# Patient Record
Sex: FEMALE | Race: BLACK OR AFRICAN AMERICAN | NOT HISPANIC OR LATINO | URBAN - METROPOLITAN AREA
[De-identification: names, ages, dates, MRNs, and addresses within clinical notes are randomized per-mention and may not be internally consistent; named-entity substitution may affect disease eponyms.]

---

## 2024-01-25 ENCOUNTER — INPATIENT (INPATIENT)
Facility: HOSPITAL | Age: 74
LOS: 6 days | Discharge: HOME | End: 2024-02-01
Attending: INTERNAL MEDICINE | Admitting: INTERNAL MEDICINE
Payer: MEDICARE

## 2024-01-25 VITALS
DIASTOLIC BLOOD PRESSURE: 78 MMHG | RESPIRATION RATE: 18 BRPM | OXYGEN SATURATION: 96 % | SYSTOLIC BLOOD PRESSURE: 123 MMHG | TEMPERATURE: 98 F | HEART RATE: 112 BPM

## 2024-01-25 PROCEDURE — 99285 EMERGENCY DEPT VISIT HI MDM: CPT

## 2024-01-25 RX ORDER — THIAMINE MONONITRATE (VIT B1) 100 MG
500 TABLET ORAL EVERY 8 HOURS
Refills: 0 | Status: DISCONTINUED | OUTPATIENT
Start: 2024-01-25 | End: 2024-01-30

## 2024-01-25 RX ORDER — THIAMINE MONONITRATE (VIT B1) 100 MG
500 TABLET ORAL ONCE
Refills: 0 | Status: DISCONTINUED | OUTPATIENT
Start: 2024-01-25 | End: 2024-01-25

## 2024-01-25 RX ORDER — PREGABALIN 225 MG/1
1000 CAPSULE ORAL ONCE
Refills: 0 | Status: COMPLETED | OUTPATIENT
Start: 2024-01-25 | End: 2024-01-25

## 2024-01-25 RX ORDER — FOLIC ACID 0.8 MG
1 TABLET ORAL ONCE
Refills: 0 | Status: COMPLETED | OUTPATIENT
Start: 2024-01-25 | End: 2024-01-25

## 2024-01-25 RX ADMIN — Medication 210 MILLIGRAM(S): at 23:46

## 2024-01-25 RX ADMIN — Medication 1 MILLIGRAM(S): at 23:46

## 2024-01-25 NOTE — ED PROVIDER NOTE - CLINICAL SUMMARY MEDICAL DECISION MAKING FREE TEXT BOX
Dennise: Heavy ETOH use. Acting with inappropriate decisions. Ataxic. Consider Wernicke-Korsakoff. Give thiamine. Check labs. Tox screen. Admit.

## 2024-01-25 NOTE — ED PROVIDER NOTE - ATTENDING CONTRIBUTION TO CARE
I performed a face-to-face evaluation of the patient and performed a history and physical examination. I agree with the history and physical examination. If this was a PA visit, I personally saw the patient with the PA and performed a substantive portion of the visit including all aspects of the medical decision making.    Heavy ETOH use. Acting with inappropriate decisions. Ataxic. Consider Wernicke-Korsakoff. Give thiamine. Check labs. Tox screen. Admit.

## 2024-01-25 NOTE — ED ADULT TRIAGE NOTE - CHIEF COMPLAINT QUOTE
Patient saw PCP today and was ordered to come to ED for ETOH encephalopathy. As per sister, patient is more confused than normal starting yesterday. As per sister, patient is incontinent, which occurs when she drinks.  Last drink was yesterday. Patient A&OX4 at this time. PHX- DM2 no insulin, HTN. bgl 228. Patient denies CP, SOB. Patient able to follow commands. Patient saw PCP today and was ordered to come to ED for hospital admission due to ETOH encephalopathy. As per sister, patient is more confused than normal starting yesterday. As per sister, patient is incontinent, which occurs when she drinks.  Last drink was yesterday. Patient A&OX4 at this time. PHX- DM2 no insulin, HTN. bgl 228. Patient denies CP, SOB. Patient able to follow commands. Patient tachycardic in triage. EKG to be done.

## 2024-01-25 NOTE — ED PROVIDER NOTE - PHYSICAL EXAMINATION
Gen: NAD, AOx3, able to make needs known, non-toxic  Head: NCAT  HEENT: EOMI, normal conjunctiva  Lung: CTAB, no respiratory distress, no wheezes/rhonchi/rales B/L, speaking in full sentences  CV: RRR, no M/R/G, pulses bilaterally   Abd: soft, NTND, no guarding   MSK: no visible bony deformities  Neuro: No focal sensory or motor deficits, able to stand on her own however gait is shuffling/mildly unsteady   Skin: Warm, well perfused, no rash  Psych: normal affect

## 2024-01-25 NOTE — ED PROVIDER NOTE - PROGRESS NOTE DETAILS
Messi, PGY3 - Concern for cerebral venous thrombosis on CAT scan, CTV ordered, neurology paged. Messi, PGY3 - Neurology Edgardo consulted, will come see patient.

## 2024-01-25 NOTE — ED PROVIDER NOTE - OBJECTIVE STATEMENT
73-year-old woman with PMH HTN, alcohol abuse, presenting due to concerns of Wernicke encephalopathy. per sister at bedside she grew concerned starting yesterday when her sister started acting weird e.g. trying to wash dishes with the rubber glove acting as a sponge, trying to take her pills but instead of using water put lotion on her hands, tried to cook dinner but put the pan on the stove with nothing on it and turned it on, staring out into space and not answering questions.  Sister states that today patient appears to be improved however still not at her baseline, has also been having trouble with ambulation and is answering questions slowly that she states is usually present when she drinks.  Found an empty bottle of saniya yesterday however unclear when she actually drink it.  Patient states that she drinks saniya twice a day, small cups.  States that the last time she drank was yesterday, however unable to give a clear answer on consistency, amount.  denies fevers, chills, pain.  Sister and patient went to see PCP Milka Hartmann today who recommended ER evaluation.  Patient has been endorsing headache over the few days prior to confusion episodes.

## 2024-01-26 DIAGNOSIS — M10.9 GOUT, UNSPECIFIED: ICD-10-CM

## 2024-01-26 DIAGNOSIS — R94.31 ABNORMAL ELECTROCARDIOGRAM [ECG] [EKG]: ICD-10-CM

## 2024-01-26 DIAGNOSIS — E11.65 TYPE 2 DIABETES MELLITUS WITH HYPERGLYCEMIA: ICD-10-CM

## 2024-01-26 DIAGNOSIS — E51.2 WERNICKE'S ENCEPHALOPATHY: ICD-10-CM

## 2024-01-26 DIAGNOSIS — G93.40 ENCEPHALOPATHY, UNSPECIFIED: ICD-10-CM

## 2024-01-26 DIAGNOSIS — F10.10 ALCOHOL ABUSE, UNCOMPLICATED: ICD-10-CM

## 2024-01-26 DIAGNOSIS — R93.89 ABNORMAL FINDINGS ON DIAGNOSTIC IMAGING OF OTHER SPECIFIED BODY STRUCTURES: ICD-10-CM

## 2024-01-26 DIAGNOSIS — Z29.9 ENCOUNTER FOR PROPHYLACTIC MEASURES, UNSPECIFIED: ICD-10-CM

## 2024-01-26 DIAGNOSIS — I10 ESSENTIAL (PRIMARY) HYPERTENSION: ICD-10-CM

## 2024-01-26 LAB
ALBUMIN SERPL ELPH-MCNC: 3.6 G/DL — SIGNIFICANT CHANGE UP (ref 3.3–5)
ALBUMIN SERPL ELPH-MCNC: 4.3 G/DL — SIGNIFICANT CHANGE UP (ref 3.3–5)
ALP SERPL-CCNC: 61 U/L — SIGNIFICANT CHANGE UP (ref 40–120)
ALP SERPL-CCNC: 72 U/L — SIGNIFICANT CHANGE UP (ref 40–120)
ALT FLD-CCNC: 13 U/L — SIGNIFICANT CHANGE UP (ref 4–33)
ALT FLD-CCNC: 9 U/L — SIGNIFICANT CHANGE UP (ref 4–33)
AMMONIA BLD-MCNC: 12 UMOL/L — SIGNIFICANT CHANGE UP (ref 11–55)
ANION GAP SERPL CALC-SCNC: 10 MMOL/L — SIGNIFICANT CHANGE UP (ref 7–14)
ANION GAP SERPL CALC-SCNC: 15 MMOL/L — HIGH (ref 7–14)
APPEARANCE UR: CLEAR — SIGNIFICANT CHANGE UP
APTT BLD: 24.2 SEC — LOW (ref 24.5–35.6)
AST SERPL-CCNC: 16 U/L — SIGNIFICANT CHANGE UP (ref 4–32)
AST SERPL-CCNC: 17 U/L — SIGNIFICANT CHANGE UP (ref 4–32)
BACTERIA # UR AUTO: NEGATIVE /HPF — SIGNIFICANT CHANGE UP
BASOPHILS # BLD AUTO: 0.03 K/UL — SIGNIFICANT CHANGE UP (ref 0–0.2)
BASOPHILS # BLD AUTO: 0.05 K/UL — SIGNIFICANT CHANGE UP (ref 0–0.2)
BASOPHILS NFR BLD AUTO: 0.3 % — SIGNIFICANT CHANGE UP (ref 0–2)
BASOPHILS NFR BLD AUTO: 0.6 % — SIGNIFICANT CHANGE UP (ref 0–2)
BILIRUB SERPL-MCNC: 0.3 MG/DL — SIGNIFICANT CHANGE UP (ref 0.2–1.2)
BILIRUB SERPL-MCNC: 0.4 MG/DL — SIGNIFICANT CHANGE UP (ref 0.2–1.2)
BILIRUB UR-MCNC: NEGATIVE — SIGNIFICANT CHANGE UP
BLD GP AB SCN SERPL QL: NEGATIVE — SIGNIFICANT CHANGE UP
BUN SERPL-MCNC: 17 MG/DL — SIGNIFICANT CHANGE UP (ref 7–23)
BUN SERPL-MCNC: 22 MG/DL — SIGNIFICANT CHANGE UP (ref 7–23)
CALCIUM SERPL-MCNC: 8 MG/DL — LOW (ref 8.4–10.5)
CALCIUM SERPL-MCNC: 8.8 MG/DL — SIGNIFICANT CHANGE UP (ref 8.4–10.5)
CAST: 0 /LPF — SIGNIFICANT CHANGE UP (ref 0–4)
CHLORIDE SERPL-SCNC: 107 MMOL/L — SIGNIFICANT CHANGE UP (ref 98–107)
CHLORIDE SERPL-SCNC: 110 MMOL/L — HIGH (ref 98–107)
CK MB BLD-MCNC: 1.6 % — SIGNIFICANT CHANGE UP (ref 0–2.5)
CK MB CFR SERPL CALC: 1.2 NG/ML — SIGNIFICANT CHANGE UP
CK SERPL-CCNC: 74 U/L — SIGNIFICANT CHANGE UP (ref 25–170)
CO2 SERPL-SCNC: 26 MMOL/L — SIGNIFICANT CHANGE UP (ref 22–31)
CO2 SERPL-SCNC: 27 MMOL/L — SIGNIFICANT CHANGE UP (ref 22–31)
COLOR SPEC: YELLOW — SIGNIFICANT CHANGE UP
CREAT SERPL-MCNC: 1.08 MG/DL — SIGNIFICANT CHANGE UP (ref 0.5–1.3)
CREAT SERPL-MCNC: 1.41 MG/DL — HIGH (ref 0.5–1.3)
DIFF PNL FLD: NEGATIVE — SIGNIFICANT CHANGE UP
EGFR: 40 ML/MIN/1.73M2 — LOW
EGFR: 55 ML/MIN/1.73M2 — LOW
EOSINOPHIL # BLD AUTO: 0.03 K/UL — SIGNIFICANT CHANGE UP (ref 0–0.5)
EOSINOPHIL # BLD AUTO: 0.03 K/UL — SIGNIFICANT CHANGE UP (ref 0–0.5)
EOSINOPHIL NFR BLD AUTO: 0.3 % — SIGNIFICANT CHANGE UP (ref 0–6)
EOSINOPHIL NFR BLD AUTO: 0.4 % — SIGNIFICANT CHANGE UP (ref 0–6)
ETHANOL SERPL-MCNC: <10 MG/DL — SIGNIFICANT CHANGE UP
FOLATE SERPL-MCNC: >20 NG/ML — HIGH (ref 3.1–17.5)
GLUCOSE BLDC GLUCOMTR-MCNC: 137 MG/DL — HIGH (ref 70–99)
GLUCOSE BLDC GLUCOMTR-MCNC: 159 MG/DL — HIGH (ref 70–99)
GLUCOSE BLDC GLUCOMTR-MCNC: 202 MG/DL — HIGH (ref 70–99)
GLUCOSE SERPL-MCNC: 140 MG/DL — HIGH (ref 70–99)
GLUCOSE SERPL-MCNC: 154 MG/DL — HIGH (ref 70–99)
GLUCOSE UR QL: NEGATIVE MG/DL — SIGNIFICANT CHANGE UP
HCT VFR BLD CALC: 32.6 % — LOW (ref 34.5–45)
HCT VFR BLD CALC: 38.7 % — SIGNIFICANT CHANGE UP (ref 34.5–45)
HGB BLD-MCNC: 10.5 G/DL — LOW (ref 11.5–15.5)
HGB BLD-MCNC: 12.1 G/DL — SIGNIFICANT CHANGE UP (ref 11.5–15.5)
IANC: 4.7 K/UL — SIGNIFICANT CHANGE UP (ref 1.8–7.4)
IANC: 5.69 K/UL — SIGNIFICANT CHANGE UP (ref 1.8–7.4)
IMM GRANULOCYTES NFR BLD AUTO: 0.3 % — SIGNIFICANT CHANGE UP (ref 0–0.9)
IMM GRANULOCYTES NFR BLD AUTO: 0.3 % — SIGNIFICANT CHANGE UP (ref 0–0.9)
INR BLD: 1.02 RATIO — SIGNIFICANT CHANGE UP (ref 0.85–1.18)
KETONES UR-MCNC: NEGATIVE MG/DL — SIGNIFICANT CHANGE UP
LEUKOCYTE ESTERASE UR-ACNC: NEGATIVE — SIGNIFICANT CHANGE UP
LYMPHOCYTES # BLD AUTO: 1.96 K/UL — SIGNIFICANT CHANGE UP (ref 1–3.3)
LYMPHOCYTES # BLD AUTO: 2.32 K/UL — SIGNIFICANT CHANGE UP (ref 1–3.3)
LYMPHOCYTES # BLD AUTO: 25 % — SIGNIFICANT CHANGE UP (ref 13–44)
LYMPHOCYTES # BLD AUTO: 25.4 % — SIGNIFICANT CHANGE UP (ref 13–44)
MAGNESIUM SERPL-MCNC: 1.9 MG/DL — SIGNIFICANT CHANGE UP (ref 1.6–2.6)
MCHC RBC-ENTMCNC: 27.1 PG — SIGNIFICANT CHANGE UP (ref 27–34)
MCHC RBC-ENTMCNC: 27.1 PG — SIGNIFICANT CHANGE UP (ref 27–34)
MCHC RBC-ENTMCNC: 31.3 GM/DL — LOW (ref 32–36)
MCHC RBC-ENTMCNC: 32.2 GM/DL — SIGNIFICANT CHANGE UP (ref 32–36)
MCV RBC AUTO: 84.2 FL — SIGNIFICANT CHANGE UP (ref 80–100)
MCV RBC AUTO: 86.6 FL — SIGNIFICANT CHANGE UP (ref 80–100)
MONOCYTES # BLD AUTO: 0.96 K/UL — HIGH (ref 0–0.9)
MONOCYTES # BLD AUTO: 1.18 K/UL — HIGH (ref 0–0.9)
MONOCYTES NFR BLD AUTO: 12.4 % — SIGNIFICANT CHANGE UP (ref 2–14)
MONOCYTES NFR BLD AUTO: 12.7 % — SIGNIFICANT CHANGE UP (ref 2–14)
NEUTROPHILS # BLD AUTO: 4.7 K/UL — SIGNIFICANT CHANGE UP (ref 1.8–7.4)
NEUTROPHILS # BLD AUTO: 5.69 K/UL — SIGNIFICANT CHANGE UP (ref 1.8–7.4)
NEUTROPHILS NFR BLD AUTO: 60.9 % — SIGNIFICANT CHANGE UP (ref 43–77)
NEUTROPHILS NFR BLD AUTO: 61.4 % — SIGNIFICANT CHANGE UP (ref 43–77)
NITRITE UR-MCNC: NEGATIVE — SIGNIFICANT CHANGE UP
NRBC # BLD: 0 /100 WBCS — SIGNIFICANT CHANGE UP (ref 0–0)
NRBC # BLD: 0 /100 WBCS — SIGNIFICANT CHANGE UP (ref 0–0)
NRBC # FLD: 0 K/UL — SIGNIFICANT CHANGE UP (ref 0–0)
NRBC # FLD: 0 K/UL — SIGNIFICANT CHANGE UP (ref 0–0)
PH UR: 5.5 — SIGNIFICANT CHANGE UP (ref 5–8)
PHOSPHATE SERPL-MCNC: 3.5 MG/DL — SIGNIFICANT CHANGE UP (ref 2.5–4.5)
PLATELET # BLD AUTO: 219 K/UL — SIGNIFICANT CHANGE UP (ref 150–400)
PLATELET # BLD AUTO: 265 K/UL — SIGNIFICANT CHANGE UP (ref 150–400)
POTASSIUM SERPL-MCNC: 4 MMOL/L — SIGNIFICANT CHANGE UP (ref 3.5–5.3)
POTASSIUM SERPL-MCNC: 4.5 MMOL/L — SIGNIFICANT CHANGE UP (ref 3.5–5.3)
POTASSIUM SERPL-SCNC: 4 MMOL/L — SIGNIFICANT CHANGE UP (ref 3.5–5.3)
POTASSIUM SERPL-SCNC: 4.5 MMOL/L — SIGNIFICANT CHANGE UP (ref 3.5–5.3)
PROT SERPL-MCNC: 6.4 G/DL — SIGNIFICANT CHANGE UP (ref 6–8.3)
PROT SERPL-MCNC: 7.9 G/DL — SIGNIFICANT CHANGE UP (ref 6–8.3)
PROT UR-MCNC: NEGATIVE MG/DL — SIGNIFICANT CHANGE UP
PROTHROM AB SERPL-ACNC: 11.4 SEC — SIGNIFICANT CHANGE UP (ref 9.5–13)
RBC # BLD: 3.87 M/UL — SIGNIFICANT CHANGE UP (ref 3.8–5.2)
RBC # BLD: 4.47 M/UL — SIGNIFICANT CHANGE UP (ref 3.8–5.2)
RBC # FLD: 16.4 % — HIGH (ref 10.3–14.5)
RBC # FLD: 16.6 % — HIGH (ref 10.3–14.5)
RBC CASTS # UR COMP ASSIST: 1 /HPF — SIGNIFICANT CHANGE UP (ref 0–4)
RH IG SCN BLD-IMP: POSITIVE — SIGNIFICANT CHANGE UP
SODIUM SERPL-SCNC: 147 MMOL/L — HIGH (ref 135–145)
SODIUM SERPL-SCNC: 148 MMOL/L — HIGH (ref 135–145)
SP GR SPEC: 1.02 — SIGNIFICANT CHANGE UP (ref 1–1.03)
SQUAMOUS # UR AUTO: 0 /HPF — SIGNIFICANT CHANGE UP (ref 0–5)
TROPONIN T, HIGH SENSITIVITY RESULT: 14 NG/L — SIGNIFICANT CHANGE UP
TSH SERPL-MCNC: 1.13 UIU/ML — SIGNIFICANT CHANGE UP (ref 0.27–4.2)
UROBILINOGEN FLD QL: 0.2 MG/DL — SIGNIFICANT CHANGE UP (ref 0.2–1)
VIT B12 SERPL-MCNC: 688 PG/ML — SIGNIFICANT CHANGE UP (ref 200–900)
WBC # BLD: 7.72 K/UL — SIGNIFICANT CHANGE UP (ref 3.8–10.5)
WBC # BLD: 9.28 K/UL — SIGNIFICANT CHANGE UP (ref 3.8–10.5)
WBC # FLD AUTO: 7.72 K/UL — SIGNIFICANT CHANGE UP (ref 3.8–10.5)
WBC # FLD AUTO: 9.28 K/UL — SIGNIFICANT CHANGE UP (ref 3.8–10.5)
WBC UR QL: 0 /HPF — SIGNIFICANT CHANGE UP (ref 0–5)

## 2024-01-26 PROCEDURE — 93306 TTE W/DOPPLER COMPLETE: CPT | Mod: 26

## 2024-01-26 PROCEDURE — 70450 CT HEAD/BRAIN W/O DYE: CPT | Mod: 26,MG

## 2024-01-26 PROCEDURE — 71045 X-RAY EXAM CHEST 1 VIEW: CPT | Mod: 26

## 2024-01-26 PROCEDURE — 99223 1ST HOSP IP/OBS HIGH 75: CPT

## 2024-01-26 PROCEDURE — G1004: CPT

## 2024-01-26 RX ORDER — FOLIC ACID 0.8 MG
1 TABLET ORAL DAILY
Refills: 0 | Status: DISCONTINUED | OUTPATIENT
Start: 2024-01-26 | End: 2024-02-01

## 2024-01-26 RX ORDER — INSULIN LISPRO 100/ML
VIAL (ML) SUBCUTANEOUS
Refills: 0 | Status: DISCONTINUED | OUTPATIENT
Start: 2024-01-26 | End: 2024-02-01

## 2024-01-26 RX ORDER — INSULIN LISPRO 100/ML
VIAL (ML) SUBCUTANEOUS AT BEDTIME
Refills: 0 | Status: DISCONTINUED | OUTPATIENT
Start: 2024-01-26 | End: 2024-02-01

## 2024-01-26 RX ORDER — DEXTROSE 50 % IN WATER 50 %
12.5 SYRINGE (ML) INTRAVENOUS ONCE
Refills: 0 | Status: DISCONTINUED | OUTPATIENT
Start: 2024-01-26 | End: 2024-02-01

## 2024-01-26 RX ORDER — SODIUM CHLORIDE 9 MG/ML
1000 INJECTION, SOLUTION INTRAVENOUS
Refills: 0 | Status: DISCONTINUED | OUTPATIENT
Start: 2024-01-26 | End: 2024-02-01

## 2024-01-26 RX ORDER — SODIUM CHLORIDE 9 MG/ML
1000 INJECTION INTRAMUSCULAR; INTRAVENOUS; SUBCUTANEOUS ONCE
Refills: 0 | Status: COMPLETED | OUTPATIENT
Start: 2024-01-26 | End: 2024-01-26

## 2024-01-26 RX ORDER — PANTOPRAZOLE SODIUM 20 MG/1
1 TABLET, DELAYED RELEASE ORAL
Refills: 0 | DISCHARGE

## 2024-01-26 RX ORDER — ENOXAPARIN SODIUM 100 MG/ML
40 INJECTION SUBCUTANEOUS EVERY 24 HOURS
Refills: 0 | Status: DISCONTINUED | OUTPATIENT
Start: 2024-01-26 | End: 2024-02-01

## 2024-01-26 RX ORDER — ACETAMINOPHEN 500 MG
650 TABLET ORAL EVERY 6 HOURS
Refills: 0 | Status: DISCONTINUED | OUTPATIENT
Start: 2024-01-26 | End: 2024-02-01

## 2024-01-26 RX ORDER — DEXTROSE 50 % IN WATER 50 %
25 SYRINGE (ML) INTRAVENOUS ONCE
Refills: 0 | Status: DISCONTINUED | OUTPATIENT
Start: 2024-01-26 | End: 2024-02-01

## 2024-01-26 RX ORDER — ALLOPURINOL 300 MG
1 TABLET ORAL
Refills: 0 | DISCHARGE

## 2024-01-26 RX ORDER — PANTOPRAZOLE SODIUM 20 MG/1
40 TABLET, DELAYED RELEASE ORAL
Refills: 0 | Status: DISCONTINUED | OUTPATIENT
Start: 2024-01-26 | End: 2024-02-01

## 2024-01-26 RX ORDER — GLUCAGON INJECTION, SOLUTION 0.5 MG/.1ML
1 INJECTION, SOLUTION SUBCUTANEOUS ONCE
Refills: 0 | Status: DISCONTINUED | OUTPATIENT
Start: 2024-01-26 | End: 2024-02-01

## 2024-01-26 RX ORDER — DEXTROSE 50 % IN WATER 50 %
15 SYRINGE (ML) INTRAVENOUS ONCE
Refills: 0 | Status: DISCONTINUED | OUTPATIENT
Start: 2024-01-26 | End: 2024-02-01

## 2024-01-26 RX ORDER — LANOLIN ALCOHOL/MO/W.PET/CERES
3 CREAM (GRAM) TOPICAL AT BEDTIME
Refills: 0 | Status: DISCONTINUED | OUTPATIENT
Start: 2024-01-26 | End: 2024-02-01

## 2024-01-26 RX ORDER — ALLOPURINOL 300 MG
100 TABLET ORAL DAILY
Refills: 0 | Status: DISCONTINUED | OUTPATIENT
Start: 2024-01-26 | End: 2024-02-01

## 2024-01-26 RX ORDER — METFORMIN HYDROCHLORIDE 850 MG/1
1 TABLET ORAL
Refills: 0 | DISCHARGE

## 2024-01-26 RX ADMIN — Medication 2: at 13:02

## 2024-01-26 RX ADMIN — Medication 210 MILLIGRAM(S): at 06:16

## 2024-01-26 RX ADMIN — SODIUM CHLORIDE 1000 MILLILITER(S): 9 INJECTION INTRAMUSCULAR; INTRAVENOUS; SUBCUTANEOUS at 02:36

## 2024-01-26 RX ADMIN — Medication 210 MILLIGRAM(S): at 15:35

## 2024-01-26 RX ADMIN — Medication 210 MILLIGRAM(S): at 21:30

## 2024-01-26 RX ADMIN — ENOXAPARIN SODIUM 40 MILLIGRAM(S): 100 INJECTION SUBCUTANEOUS at 08:01

## 2024-01-26 RX ADMIN — Medication 1 MILLIGRAM(S): at 13:50

## 2024-01-26 RX ADMIN — PANTOPRAZOLE SODIUM 40 MILLIGRAM(S): 20 TABLET, DELAYED RELEASE ORAL at 07:45

## 2024-01-26 RX ADMIN — Medication 1 TABLET(S): at 13:50

## 2024-01-26 RX ADMIN — Medication 100 MILLIGRAM(S): at 13:50

## 2024-01-26 RX ADMIN — PREGABALIN 1000 MICROGRAM(S): 225 CAPSULE ORAL at 00:27

## 2024-01-26 NOTE — ED ADULT NURSE NOTE - NSFALLRISKINTERV_ED_ALL_ED
Assistance OOB with selected safe patient handling equipment if applicable/Assistance with ambulation/Communicate fall risk and risk factors to all staff, patient, and family/Monitor gait and stability/Provide visual cue: yellow wristband, yellow gown, etc/Reinforce activity limits and safety measures with patient and family/Call bell, personal items and telephone in reach/Instruct patient to call for assistance before getting out of bed/chair/stretcher/Non-slip footwear applied when patient is off stretcher/[place] to call system/Physically safe environment - no spills, clutter or unnecessary equipment/Purposeful Proactive Rounding/Room/bathroom lighting operational, light cord in reach

## 2024-01-26 NOTE — ED ADULT NURSE NOTE - OBJECTIVE STATEMENT
72 y/o female, a&ox4, sent to ED by MD. Past medical history of alcohol abuse, pt endorses daily drinking, thinks her last drink was yesterday evening. Pt sent to ED by MD for concern of encephalopathy. Family at bedside endorsing strange behavior. Pt presents calm and cooperative, following commands, however, slow to respond. Denying any symptoms of alcohol withdrawal at this time. 20GIV placed to [facility], labs collected and sent off. Pt medicated as per MD orders. Airway is patent, speaking in clear and coherent sentences. Respirations are even and unlabored, no signs of respiratory distress.

## 2024-01-26 NOTE — H&P ADULT - PROBLEM SELECTOR PLAN 7
Lovenox 40mg qd  DASH/TLC CC diet sinus tachycardia @ 107bpm, biatrial enlargement, downsloping/STD? ST segment in lateral leads, II, AVF  -tele, echo ordered  -no CP, SOB or other cardiac complaints

## 2024-01-26 NOTE — H&P ADULT - PROBLEM/PLAN-6
Talked to father about forms requested by mom   Children in his care and he is requesting info not to be given out    He will discuss with mother gabbi poon and other sibs    DISPLAY PLAN FREE TEXT

## 2024-01-26 NOTE — ED ADULT NURSE NOTE - CHIEF COMPLAINT QUOTE
Patient saw PCP today and was ordered to come to ED for hospital admission due to ETOH encephalopathy. As per sister, patient is more confused than normal starting yesterday. As per sister, patient is incontinent, which occurs when she drinks.  Last drink was yesterday. Patient A&OX4 at this time. PHX- DM2 no insulin, HTN. bgl 228. Patient denies CP, SOB. Patient able to follow commands. Patient tachycardic in triage. EKG to be done.

## 2024-01-26 NOTE — PHARMACOTHERAPY INTERVENTION NOTE - COMMENTS
Medication history is incomplete. Unable to verify patient's medication list with two sources. Source: [facility]    As per [facility] , no recent fills of allopurinol, or colchicine.

## 2024-01-26 NOTE — H&P ADULT - NSHPREVIEWOFSYSTEMS_GEN_ALL_CORE
ROS:    Constitutional: [ ] fevers [ ] chills   HEENT: [ ] postnasal drip [ ] nasal congestion  CV: [ ] chest pain [ ] orthopnea [ ] palpitations   Resp: [ ] cough [ ] shortness of breath [ ] dyspnea   GI: [ ] nausea [ ] vomiting [ ] diarrhea [ ] constipation [ ] abd pain  : [ ] dysuria  [ ] increased urinary frequency  Musculoskeletal: [ ] back pain [ ] myalgias [ ] arthralgias [ ] fracture  Skin: [ ] rash [ ] itch  Neurological: [ ] headache [ ] dizziness [ ] syncope [ x] confusion [x] ataxia  Psychiatric: [ ] anxiety [ ] depression  Endocrine: [ ] diabetes [ ] thyroid problem  Hematologic/Lymphatic: [ ] anemia [ ] bleeding problem  [x ] All other systems negative

## 2024-01-26 NOTE — CONSULT NOTE ADULT - ASSESSMENT
ASSESSMENT   73y woman with a PMHx significant for HTN, alcohol abuse, presenting due to concerns of Wernicke encephalopathy. Patient has been endorsing headache over the few days prior to confusion episodes. Upon interviewing, the patient complained of a bifrontal headache that was constant with slow onset but with max intensity at 10/10. Currently denies headache, visual nor audio hallucinations. CT head demonstrating possible straight sinus thrombosis for which neurology was consulted. NIHSS 2 for dysarthria and wrong age. No focal deficits.     IMPRESSION   Found on CT head to have possible venous sinus thrombosis. Currently asymptomatic, therefore would defer acute intervention for now, pending additional imaging     RECOMMENDATION   [ ] check UA, Utox, ETOH level, TSH, vitamin B1, B6, B12, folate, lactate, creatine kinase, ammonia  [ ] Thiamine repletion   [ ] MR brain w/o contrast, MRV head and neck with contrast, if no contraindications  [ ] Will hold on symptomatic management for now. If worsening throughout the evening, can provide: Toradol 30mg, Reglan 10mg, Benadryl 25mg   [ ] IV hydration  [ ] Further recs pending MR imaging results  [ ] Can consider STAT CT head non-contrast for any change in neuro exam    Patient to be seen by team and attending. Note finalized upon attending attestation.

## 2024-01-26 NOTE — H&P ADULT - NSHPLABSRESULTS_GEN_ALL_CORE
Personally reviewed labs:                        10.5   7.72  )-----------( 219      ( 26 Jan 2024 06:00 )             32.6     01-25-24 @ 23:25    148<H>  |  107  |  22             --------------------------< 154<H>     4.5  |  26  | 1.41<H>    eGFR AA: --  eGFR N-AA: --    Calcium: 8.8  Phosphorus: 3.5  Magnesium: 1.90    AST: 17    ALT: 13  AlkPhos: 72  Protein: 7.9  Albumin: 4.3  TBili: 0.3  D-Bili: --          RADIOLOGY & ADDITIONAL TESTS:    EKG my independent interpretation: sinus tachycardia @ 107bpm, biatrial enlargement, downsloping/STD? ST segment in lateral leads, II, AVF      Imaging personally reviewed:    CTH :  IMPRESSION:  Hyperdense straight sinus and vein of Goyo suspicious for central venous   thrombosis. Recommend CT venogram.

## 2024-01-26 NOTE — H&P ADULT - PROBLEM SELECTOR PLAN 2
Drinks one bottle of saniya per day, last drink 1/24 4PM. Denies withdrawal sx at this time. Comfortable appearing  -MV, FA, thiamine as above  -CIWA with sx triggered ativan

## 2024-01-26 NOTE — SBIRT NOTE ADULT - NSSBIRTALCPASSREFTXDET_GEN_A_CORE
Provided SBIRT services: Full screen positive. Referral to Treatment Performed. Screening results were reviewed with the patient and patient was provided information about healthy guidelines and potential negative consequences associated with level of risk. Motivation and readiness to reduce or stop use was discussed and goals and activities to make changes were suggested/offered. Referral for complete assessment and level of care determination at a certified treatment facility was completed by giving the patient information for treatment facilities that met their needs and encouraging them to call for an appointment. several calls made to facilities but no appointment made.

## 2024-01-26 NOTE — H&P ADULT - PROBLEM SELECTOR PLAN 3
CTH: Hyperdense straight sinus and vein of Goyo suspicious for central venous thrombosis. Currently asymptomatic and no focal deficits. No HA at this time.   -Seen by neuro and recommended no intervention and obtain MRI to confirm. MRI brain without contrast and MRV H/N with IV contrast

## 2024-01-26 NOTE — PATIENT PROFILE ADULT - FALL HARM RISK - HARM RISK INTERVENTIONS
Assistance with ambulation/Assistance OOB with selected safe patient handling equipment/Communicate Risk of Fall with Harm to all staff/Discuss with provider need for PT consult/Monitor for mental status changes/Monitor gait and stability/Reinforce activity limits and safety measures with patient and family/Reorient to person, place and time as needed/Review medications for side effects contributing to fall risk/Sit up slowly, dangle for a short time, stand at bedside before walking/Tailored Fall Risk Interventions/Toileting schedule using arm’s reach rule for commode and bathroom/Use of alarms - bed, chair and/or voice tab/Visual Cue: Yellow wristband and red socks/Bed in lowest position, wheels locked, appropriate side rails in place/Call bell, personal items and telephone in reach/Instruct patient to call for assistance before getting out of bed or chair/Non-slip footwear when patient is out of bed/[place] to call system/Physically safe environment - no spills, clutter or unnecessary equipment/Purposeful Proactive Rounding/Room/bathroom lighting operational, light cord in reach

## 2024-01-26 NOTE — PROVIDER CONTACT NOTE (OTHER) - BACKGROUND
73y woman with a PMHx significant for HTN, alcohol abuse, presenting due to concerns of Wernicke encephalopathy.

## 2024-01-26 NOTE — CONSULT NOTE ADULT - ATTENDING COMMENTS
Ms. Dunlap is admitted for symptoms suggestive of Warnicke's encephalopathy in the setting of alcohol abuse.  She has past medical history of hypertension.  Noncontrast head CT reveals hyperdense straits sinus ambulance given which was suspicious for cerebral sinus venous thrombosis.  At this time on imaging there is no venous infarction or hemorrhage or apparent vascular malformation.  I have recommended MRI brain with and without contrast, MRV to rule out sinus venous thrombosis.  Until then she should be on aspirin 81 mg and Lovenox prophylaxis dose.  Stroke team will follow imaging and make further recommendations.

## 2024-01-26 NOTE — PROGRESS NOTE ADULT - ASSESSMENT
73F with PMHx HTN, gout, ETOH abuse, DM2, GERD presenting with confusion brought in by sister. Admitted for acute encephalopathy and concern for wernicke's encephalopathy

## 2024-01-26 NOTE — H&P ADULT - PROBLEM SELECTOR PLAN 1
Odd behavior at home. Here without complaints at this time and oriented x3. Hx of ataxia as well and ETOH abuse. Possible wernicke's encephalopathy   -IV thiamine 500mg q8. (dose given prior to lab draw so thiamine level likely to be inaccurate)  -folate, MV  -CTH: Hyperdense straight sinus and vein of Goyo suspicious for central venous thrombosis. Currently asymptomatic and no focal deficits. No HA at this time. Seen by neuro and recommended no intervention and obtain MRI to confirm  -check UA (neg), Utox, ETOH level (neg), TSH, vitamin B1, B6, B12, folate, lactate, creatine kinase, ammonia (nl)  -toradol, reglan, benadryl if needed for HA if it returns  -no photophobia, no fever, no meningeal signs. Do not suspect meningitis

## 2024-01-26 NOTE — H&P ADULT - NSHPPHYSICALEXAM_GEN_ALL_CORE
PHYSICAL EXAM:  Vital Signs Last 24 Hrs  T(C): 37.1 (01-26-24 @ 04:35)  T(F): 98.7 (01-26-24 @ 04:35), Max: 98.7 (01-26-24 @ 04:35)  HR: 78 (01-26-24 @ 04:35) (78 - 112)  BP: 152/92 (01-26-24 @ 04:35)  BP(mean): --  RR: 16 (01-26-24 @ 04:35) (16 - 20)  SpO2: 100% (01-26-24 @ 04:35) (96% - 100%)  Wt(kg): --    Constitutional: NAD, awake and alert, well developed  EYES: EOMI, conjunctiva clear  ENT:  Normal Hearing, no tonsillar exudates   Neck: Soft and supple , no thyromegaly   Respiratory: Breath sounds are clear bilaterally, No wheezing, rales or rhonchi, no tachypnea, no accessory muscle use  Cardiovascular: S1 and S2, regular rate and rhythm, no Murmurs, gallops or rubs, no JVD, no leg edema  Gastrointestinal: Bowel Sounds present, soft, nontender, nondistended, no guarding, no rebound  Extremities: No cyanosis or clubbing; warm to touch  Vascular: 2+ peripheral pulses lower ex  Neurological: No focal deficits, CN II-XII intact bilaterally, sensation to light touch intact in all extremities.   Musculoskeletal: 5/5 strength b/l upper and lower extremities; no joint swelling.  Skin: No rashes, no ulcerations
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
## 2024-01-26 NOTE — H&P ADULT - NSICDXPASTMEDICALHX_GEN_ALL_CORE_FT
PAST MEDICAL HISTORY:  HTN (hypertension)     Type 2 diabetes mellitus with hyperglycemia, without long-term current use of insulin

## 2024-01-26 NOTE — ED ADULT NURSE REASSESSMENT NOTE - NS ED NURSE REASSESS COMMENT FT1
break coverage rn. received report from RN. pt A&Ox4, denies chest pain, SOB,n/v,headache, dizziness, numbness/tingling to hands/feet. resp even unlabored, abd soft, pedal pulses 2+ bilaterally. Pt given cyanocobalamin, made aware of reasoning. safety maintained. awaiting CT. no complaints
Pt resting in bed, denies CP, SOB, or dyspnea at this time. Airway is patent, speaking in clear and coherent sentences. Respirations are even and unlabored, no signs of respiratory distress.

## 2024-01-26 NOTE — H&P ADULT - ASSESSMENT
73F with PMHx HTN, ETOH abuse, DM2, GERD presenting with confusion brought in by sister. Admitted for acute encephalopathy and concern for wernicke's encephalopathy 73F with PMHx HTN, gout, ETOH abuse, DM2, GERD presenting with confusion brought in by sister. Admitted for acute encephalopathy and concern for wernicke's encephalopathy

## 2024-01-26 NOTE — H&P ADULT - HISTORY OF PRESENT ILLNESS
73F with PMHx HTN, ETOH abuse, DM2, GERD presenting with confusion brought in by sister. The patient is a limited historian though is oriented x3. She states she was brought in by her sister due to her drinking. She does not feel confused at this time. She notes some unsteady gait yesterday. Her last drink was yesterday 4PM of saniya. She drinks a bottle of saniya every day. She endorses some prior history of withdrawal with tremors but never required hospitalization. Currently she denies AH, VH, tremors, anxiety of HA. She had HA earlier but denies at this time. She also denies fevers, chills, cough, CP, SOB, abdominal pain, vomiting, diarrhea, dysuria, hematuria, cardiac disease, hx seizures.    Collateral obtained from Katie (sister): The patient has been having odd behaviors at home since 1/24. For example, she put a pot on the stove without anything in it and started the flame. She was using rubber gloves like a sponge and did not use water or soap. She wakes up in the middle of the night more frequently. She took a bag of beans and put milk on it. She cut pieces of pedro bread and put on a  with plastic bag on it. On 1/23 she had some HAs preceding confusion. When she drinks sometimes her speech is not clear and sometimes she acts slower. Confusion started 1/24 and LKN was 1/23. She has been confused in the past with her drinking but this is more than usual. When she drinks she usually has ataxic gait. Today she was slow to walk as well. Went to PMD today and told to come to ED. At baseline has some forgetfulness for a long time. No FHx of dementia.     Home meds (obtained from sister)  Metformin 1000mg BID  Pantoprazole 40mg qd  Allopurinol 100mg qd  Colchone?/colchicine? 0.6mg 73F with PMHx HTN, gout, ETOH abuse, DM2, GERD presenting with confusion brought in by sister. The patient is a limited historian though is oriented x3. She states she was brought in by her sister due to her drinking. She does not feel confused at this time. She notes some unsteady gait yesterday. Her last drink was yesterday 4PM of saniya. She drinks a bottle of saniya every day. She endorses some prior history of withdrawal with tremors but never required hospitalization. Currently she denies AH, VH, tremors, anxiety of HA. She had HA earlier but denies at this time. She also denies fevers, chills, cough, CP, SOB, abdominal pain, vomiting, diarrhea, dysuria, hematuria, cardiac disease, hx seizures.    Collateral obtained from Katie (sister): The patient has been having odd behaviors at home since 1/24. For example, she put a pot on the stove without anything in it and started the flame. She was using rubber gloves like a sponge and did not use water or soap. She wakes up in the middle of the night more frequently. She took a bag of beans and put milk on it. She cut pieces of pedro bread and put on a  with plastic bag on it. On 1/23 she had some HAs preceding confusion. When she drinks sometimes her speech is not clear and sometimes she acts slower. Confusion started 1/24 and LKN was 1/23. She has been confused in the past with her drinking but this is more than usual. When she drinks she usually has ataxic gait. Today she was slow to walk as well. Went to PMD today and told to come to ED. At baseline has some forgetfulness for a long time. No FHx of dementia.     Home meds (obtained from sister)  Metformin 1000mg BID  Pantoprazole 40mg qd  Allopurinol 100mg qd  Colchone?/colchicine? 0.6mg

## 2024-01-27 LAB
ANION GAP SERPL CALC-SCNC: 12 MMOL/L — SIGNIFICANT CHANGE UP (ref 7–14)
BUN SERPL-MCNC: 18 MG/DL — SIGNIFICANT CHANGE UP (ref 7–23)
CALCIUM SERPL-MCNC: 8.3 MG/DL — LOW (ref 8.4–10.5)
CHLORIDE SERPL-SCNC: 109 MMOL/L — HIGH (ref 98–107)
CO2 SERPL-SCNC: 26 MMOL/L — SIGNIFICANT CHANGE UP (ref 22–31)
CREAT SERPL-MCNC: 1.11 MG/DL — SIGNIFICANT CHANGE UP (ref 0.5–1.3)
CULTURE RESULTS: SIGNIFICANT CHANGE UP
EGFR: 53 ML/MIN/1.73M2 — LOW
GLUCOSE BLDC GLUCOMTR-MCNC: 121 MG/DL — HIGH (ref 70–99)
GLUCOSE BLDC GLUCOMTR-MCNC: 139 MG/DL — HIGH (ref 70–99)
GLUCOSE BLDC GLUCOMTR-MCNC: 150 MG/DL — HIGH (ref 70–99)
GLUCOSE BLDC GLUCOMTR-MCNC: 159 MG/DL — HIGH (ref 70–99)
GLUCOSE SERPL-MCNC: 119 MG/DL — HIGH (ref 70–99)
HCT VFR BLD CALC: 32.7 % — LOW (ref 34.5–45)
HCV AB S/CO SERPL IA: 0.09 S/CO — SIGNIFICANT CHANGE UP (ref 0–0.99)
HCV AB SERPL-IMP: SIGNIFICANT CHANGE UP
HGB BLD-MCNC: 10.2 G/DL — LOW (ref 11.5–15.5)
MAGNESIUM SERPL-MCNC: 1.7 MG/DL — SIGNIFICANT CHANGE UP (ref 1.6–2.6)
MCHC RBC-ENTMCNC: 27.5 PG — SIGNIFICANT CHANGE UP (ref 27–34)
MCHC RBC-ENTMCNC: 31.2 GM/DL — LOW (ref 32–36)
MCV RBC AUTO: 88.1 FL — SIGNIFICANT CHANGE UP (ref 80–100)
NRBC # BLD: 0 /100 WBCS — SIGNIFICANT CHANGE UP (ref 0–0)
NRBC # FLD: 0 K/UL — SIGNIFICANT CHANGE UP (ref 0–0)
PHOSPHATE SERPL-MCNC: 3 MG/DL — SIGNIFICANT CHANGE UP (ref 2.5–4.5)
PLATELET # BLD AUTO: 206 K/UL — SIGNIFICANT CHANGE UP (ref 150–400)
POTASSIUM SERPL-MCNC: 3.6 MMOL/L — SIGNIFICANT CHANGE UP (ref 3.5–5.3)
POTASSIUM SERPL-SCNC: 3.6 MMOL/L — SIGNIFICANT CHANGE UP (ref 3.5–5.3)
RBC # BLD: 3.71 M/UL — LOW (ref 3.8–5.2)
RBC # FLD: 16.5 % — HIGH (ref 10.3–14.5)
SODIUM SERPL-SCNC: 147 MMOL/L — HIGH (ref 135–145)
SPECIMEN SOURCE: SIGNIFICANT CHANGE UP
WBC # BLD: 7.25 K/UL — SIGNIFICANT CHANGE UP (ref 3.8–10.5)
WBC # FLD AUTO: 7.25 K/UL — SIGNIFICANT CHANGE UP (ref 3.8–10.5)

## 2024-01-27 PROCEDURE — 99232 SBSQ HOSP IP/OBS MODERATE 35: CPT

## 2024-01-27 RX ADMIN — Medication 1: at 11:58

## 2024-01-27 RX ADMIN — Medication 1 MILLIGRAM(S): at 09:56

## 2024-01-27 RX ADMIN — PANTOPRAZOLE SODIUM 40 MILLIGRAM(S): 20 TABLET, DELAYED RELEASE ORAL at 06:29

## 2024-01-27 RX ADMIN — Medication 210 MILLIGRAM(S): at 06:29

## 2024-01-27 RX ADMIN — ENOXAPARIN SODIUM 40 MILLIGRAM(S): 100 INJECTION SUBCUTANEOUS at 09:56

## 2024-01-27 RX ADMIN — Medication 100 MILLIGRAM(S): at 09:56

## 2024-01-27 RX ADMIN — Medication 210 MILLIGRAM(S): at 13:34

## 2024-01-27 RX ADMIN — Medication 1 TABLET(S): at 09:56

## 2024-01-27 RX ADMIN — Medication 210 MILLIGRAM(S): at 20:50

## 2024-01-28 LAB
ANION GAP SERPL CALC-SCNC: 9 MMOL/L — SIGNIFICANT CHANGE UP (ref 7–14)
BUN SERPL-MCNC: 22 MG/DL — SIGNIFICANT CHANGE UP (ref 7–23)
CALCIUM SERPL-MCNC: 8.6 MG/DL — SIGNIFICANT CHANGE UP (ref 8.4–10.5)
CHLORIDE SERPL-SCNC: 107 MMOL/L — SIGNIFICANT CHANGE UP (ref 98–107)
CO2 SERPL-SCNC: 28 MMOL/L — SIGNIFICANT CHANGE UP (ref 22–31)
CREAT SERPL-MCNC: 1.15 MG/DL — SIGNIFICANT CHANGE UP (ref 0.5–1.3)
EGFR: 51 ML/MIN/1.73M2 — LOW
GLUCOSE BLDC GLUCOMTR-MCNC: 119 MG/DL — HIGH (ref 70–99)
GLUCOSE BLDC GLUCOMTR-MCNC: 140 MG/DL — HIGH (ref 70–99)
GLUCOSE BLDC GLUCOMTR-MCNC: 154 MG/DL — HIGH (ref 70–99)
GLUCOSE BLDC GLUCOMTR-MCNC: 212 MG/DL — HIGH (ref 70–99)
GLUCOSE SERPL-MCNC: 143 MG/DL — HIGH (ref 70–99)
HCT VFR BLD CALC: 33 % — LOW (ref 34.5–45)
HGB BLD-MCNC: 10.2 G/DL — LOW (ref 11.5–15.5)
MAGNESIUM SERPL-MCNC: 1.7 MG/DL — SIGNIFICANT CHANGE UP (ref 1.6–2.6)
MCHC RBC-ENTMCNC: 26.9 PG — LOW (ref 27–34)
MCHC RBC-ENTMCNC: 30.9 GM/DL — LOW (ref 32–36)
MCV RBC AUTO: 87.1 FL — SIGNIFICANT CHANGE UP (ref 80–100)
NRBC # BLD: 0 /100 WBCS — SIGNIFICANT CHANGE UP (ref 0–0)
NRBC # FLD: 0 K/UL — SIGNIFICANT CHANGE UP (ref 0–0)
PHOSPHATE SERPL-MCNC: 3.7 MG/DL — SIGNIFICANT CHANGE UP (ref 2.5–4.5)
PLATELET # BLD AUTO: 219 K/UL — SIGNIFICANT CHANGE UP (ref 150–400)
POTASSIUM SERPL-MCNC: 3.9 MMOL/L — SIGNIFICANT CHANGE UP (ref 3.5–5.3)
POTASSIUM SERPL-SCNC: 3.9 MMOL/L — SIGNIFICANT CHANGE UP (ref 3.5–5.3)
RBC # BLD: 3.79 M/UL — LOW (ref 3.8–5.2)
RBC # FLD: 16.1 % — HIGH (ref 10.3–14.5)
SODIUM SERPL-SCNC: 144 MMOL/L — SIGNIFICANT CHANGE UP (ref 135–145)
WBC # BLD: 6.53 K/UL — SIGNIFICANT CHANGE UP (ref 3.8–10.5)
WBC # FLD AUTO: 6.53 K/UL — SIGNIFICANT CHANGE UP (ref 3.8–10.5)

## 2024-01-28 PROCEDURE — 99232 SBSQ HOSP IP/OBS MODERATE 35: CPT

## 2024-01-28 RX ADMIN — Medication 1 TABLET(S): at 13:16

## 2024-01-28 RX ADMIN — ENOXAPARIN SODIUM 40 MILLIGRAM(S): 100 INJECTION SUBCUTANEOUS at 08:21

## 2024-01-28 RX ADMIN — Medication 100 MILLIGRAM(S): at 13:15

## 2024-01-28 RX ADMIN — Medication 1: at 08:04

## 2024-01-28 RX ADMIN — Medication 2: at 17:40

## 2024-01-28 RX ADMIN — Medication 210 MILLIGRAM(S): at 21:25

## 2024-01-28 RX ADMIN — Medication 1 MILLIGRAM(S): at 13:16

## 2024-01-28 RX ADMIN — Medication 210 MILLIGRAM(S): at 05:40

## 2024-01-28 RX ADMIN — PANTOPRAZOLE SODIUM 40 MILLIGRAM(S): 20 TABLET, DELAYED RELEASE ORAL at 05:40

## 2024-01-28 RX ADMIN — Medication 210 MILLIGRAM(S): at 13:19

## 2024-01-28 NOTE — PHYSICAL THERAPY INITIAL EVALUATION ADULT - PERTINENT HX OF CURRENT PROBLEM, REHAB EVAL
73F with PMHx HTN, gout, ETOH abuse, DM2, GERD presenting with confusion brought in by sister. The patient is a limited historian though is oriented x3. She states she was brought in by her sister due to her drinking. She does not feel confused at this time. She notes some unsteady gait yesterday. Her last drink was yesterday 4PM of saniya. She drinks a bottle of saniya every day. She endorses some prior history of withdrawal with tremors but never required hospitalization. Currently she denies AH, VH, tremors, anxiety of HA. She had HA earlier but denies at this time. She also denies fevers, chills, cough, CP, SOB, abdominal pain, vomiting, diarrhea, dysuria, hematuria, cardiac disease, hx seizures.

## 2024-01-28 NOTE — PHYSICAL THERAPY INITIAL EVALUATION ADULT - ADDITIONAL COMMENTS
Pt lives in a home with brother and sister, fully independent, no falls, does not use DME.   Patients Current SpO2: 99%

## 2024-01-28 NOTE — PHYSICAL THERAPY INITIAL EVALUATION ADULT - CRITERIA FOR SKILLED THERAPEUTIC INTERVENTIONS
Phone: [phone]  Fax: [facility] Office Hours:  Monday: [facility] office location 8-5 ([phone]) Offering additional late hours the first Monday of the month until 7 pm.   Tuesday: 8-5 Wednesday: 8-5 Thursday:  Additional hours offered 2 Thursdays a month. Please call to inquire those dates. Fridays: 7:30-4:30   SURVEY:    You may be receiving a survey from [facility] regarding your visit today. Please complete the survey to enable us to provide the highest quality of care to you and your family. If you cannot score us a very good on any question, please call the office to discuss how we could have made your experience a very good one. Thank you. impairments found

## 2024-01-29 LAB
ANION GAP SERPL CALC-SCNC: 10 MMOL/L — SIGNIFICANT CHANGE UP (ref 7–14)
BUN SERPL-MCNC: 19 MG/DL — SIGNIFICANT CHANGE UP (ref 7–23)
CALCIUM SERPL-MCNC: 8.7 MG/DL — SIGNIFICANT CHANGE UP (ref 8.4–10.5)
CHLORIDE SERPL-SCNC: 105 MMOL/L — SIGNIFICANT CHANGE UP (ref 98–107)
CO2 SERPL-SCNC: 26 MMOL/L — SIGNIFICANT CHANGE UP (ref 22–31)
CREAT SERPL-MCNC: 0.92 MG/DL — SIGNIFICANT CHANGE UP (ref 0.5–1.3)
EGFR: 67 ML/MIN/1.73M2 — SIGNIFICANT CHANGE UP
GLUCOSE BLDC GLUCOMTR-MCNC: 138 MG/DL — HIGH (ref 70–99)
GLUCOSE BLDC GLUCOMTR-MCNC: 141 MG/DL — HIGH (ref 70–99)
GLUCOSE BLDC GLUCOMTR-MCNC: 224 MG/DL — HIGH (ref 70–99)
GLUCOSE BLDC GLUCOMTR-MCNC: 247 MG/DL — HIGH (ref 70–99)
GLUCOSE SERPL-MCNC: 139 MG/DL — HIGH (ref 70–99)
HCT VFR BLD CALC: 34.4 % — LOW (ref 34.5–45)
HGB BLD-MCNC: 11.1 G/DL — LOW (ref 11.5–15.5)
MAGNESIUM SERPL-MCNC: 1.6 MG/DL — SIGNIFICANT CHANGE UP (ref 1.6–2.6)
MCHC RBC-ENTMCNC: 27.4 PG — SIGNIFICANT CHANGE UP (ref 27–34)
MCHC RBC-ENTMCNC: 32.3 GM/DL — SIGNIFICANT CHANGE UP (ref 32–36)
MCV RBC AUTO: 84.9 FL — SIGNIFICANT CHANGE UP (ref 80–100)
NRBC # BLD: 0 /100 WBCS — SIGNIFICANT CHANGE UP (ref 0–0)
NRBC # FLD: 0 K/UL — SIGNIFICANT CHANGE UP (ref 0–0)
PHOSPHATE SERPL-MCNC: 3.7 MG/DL — SIGNIFICANT CHANGE UP (ref 2.5–4.5)
PLATELET # BLD AUTO: 223 K/UL — SIGNIFICANT CHANGE UP (ref 150–400)
POTASSIUM SERPL-MCNC: 3.7 MMOL/L — SIGNIFICANT CHANGE UP (ref 3.5–5.3)
POTASSIUM SERPL-SCNC: 3.7 MMOL/L — SIGNIFICANT CHANGE UP (ref 3.5–5.3)
RBC # BLD: 4.05 M/UL — SIGNIFICANT CHANGE UP (ref 3.8–5.2)
RBC # FLD: 16.1 % — HIGH (ref 10.3–14.5)
SODIUM SERPL-SCNC: 141 MMOL/L — SIGNIFICANT CHANGE UP (ref 135–145)
WBC # BLD: 6.7 K/UL — SIGNIFICANT CHANGE UP (ref 3.8–10.5)
WBC # FLD AUTO: 6.7 K/UL — SIGNIFICANT CHANGE UP (ref 3.8–10.5)

## 2024-01-29 PROCEDURE — 70545 MR ANGIOGRAPHY HEAD W/DYE: CPT | Mod: 26,59

## 2024-01-29 PROCEDURE — 99232 SBSQ HOSP IP/OBS MODERATE 35: CPT

## 2024-01-29 PROCEDURE — 70548 MR ANGIOGRAPHY NECK W/DYE: CPT | Mod: 26

## 2024-01-29 PROCEDURE — 70551 MRI BRAIN STEM W/O DYE: CPT | Mod: 26

## 2024-01-29 RX ADMIN — Medication 2: at 12:17

## 2024-01-29 RX ADMIN — Medication 1 MILLIGRAM(S): at 12:34

## 2024-01-29 RX ADMIN — Medication 100 MILLIGRAM(S): at 12:34

## 2024-01-29 RX ADMIN — Medication 1 TABLET(S): at 12:34

## 2024-01-29 RX ADMIN — Medication 210 MILLIGRAM(S): at 13:06

## 2024-01-29 RX ADMIN — ENOXAPARIN SODIUM 40 MILLIGRAM(S): 100 INJECTION SUBCUTANEOUS at 08:29

## 2024-01-29 RX ADMIN — Medication 210 MILLIGRAM(S): at 21:01

## 2024-01-29 RX ADMIN — PANTOPRAZOLE SODIUM 40 MILLIGRAM(S): 20 TABLET, DELAYED RELEASE ORAL at 06:17

## 2024-01-29 RX ADMIN — Medication 210 MILLIGRAM(S): at 05:12

## 2024-01-30 DIAGNOSIS — I63.9 CEREBRAL INFARCTION, UNSPECIFIED: ICD-10-CM

## 2024-01-30 LAB
A1C WITH ESTIMATED AVERAGE GLUCOSE RESULT: 6.4 % — HIGH (ref 4–5.6)
ANION GAP SERPL CALC-SCNC: 11 MMOL/L — SIGNIFICANT CHANGE UP (ref 7–14)
BUN SERPL-MCNC: 18 MG/DL — SIGNIFICANT CHANGE UP (ref 7–23)
CALCIUM SERPL-MCNC: 8.7 MG/DL — SIGNIFICANT CHANGE UP (ref 8.4–10.5)
CHLORIDE SERPL-SCNC: 105 MMOL/L — SIGNIFICANT CHANGE UP (ref 98–107)
CHOLEST SERPL-MCNC: 137 MG/DL — SIGNIFICANT CHANGE UP
CO2 SERPL-SCNC: 25 MMOL/L — SIGNIFICANT CHANGE UP (ref 22–31)
CREAT SERPL-MCNC: 0.99 MG/DL — SIGNIFICANT CHANGE UP (ref 0.5–1.3)
EGFR: 61 ML/MIN/1.73M2 — SIGNIFICANT CHANGE UP
ESTIMATED AVERAGE GLUCOSE: 137 — SIGNIFICANT CHANGE UP
GLUCOSE BLDC GLUCOMTR-MCNC: 113 MG/DL — HIGH (ref 70–99)
GLUCOSE BLDC GLUCOMTR-MCNC: 124 MG/DL — HIGH (ref 70–99)
GLUCOSE BLDC GLUCOMTR-MCNC: 173 MG/DL — HIGH (ref 70–99)
GLUCOSE BLDC GLUCOMTR-MCNC: 244 MG/DL — HIGH (ref 70–99)
GLUCOSE SERPL-MCNC: 138 MG/DL — HIGH (ref 70–99)
HCT VFR BLD CALC: 32.6 % — LOW (ref 34.5–45)
HDLC SERPL-MCNC: 48 MG/DL — LOW
HGB BLD-MCNC: 10.6 G/DL — LOW (ref 11.5–15.5)
LIPID PNL WITH DIRECT LDL SERPL: 71 MG/DL — SIGNIFICANT CHANGE UP
MAGNESIUM SERPL-MCNC: 1.7 MG/DL — SIGNIFICANT CHANGE UP (ref 1.6–2.6)
MCHC RBC-ENTMCNC: 27.1 PG — SIGNIFICANT CHANGE UP (ref 27–34)
MCHC RBC-ENTMCNC: 32.5 GM/DL — SIGNIFICANT CHANGE UP (ref 32–36)
MCV RBC AUTO: 83.4 FL — SIGNIFICANT CHANGE UP (ref 80–100)
NON HDL CHOLESTEROL: 89 MG/DL — SIGNIFICANT CHANGE UP
NRBC # BLD: 0 /100 WBCS — SIGNIFICANT CHANGE UP (ref 0–0)
NRBC # FLD: 0 K/UL — SIGNIFICANT CHANGE UP (ref 0–0)
PHOSPHATE SERPL-MCNC: 3.7 MG/DL — SIGNIFICANT CHANGE UP (ref 2.5–4.5)
PLATELET # BLD AUTO: 221 K/UL — SIGNIFICANT CHANGE UP (ref 150–400)
POTASSIUM SERPL-MCNC: 3.9 MMOL/L — SIGNIFICANT CHANGE UP (ref 3.5–5.3)
POTASSIUM SERPL-SCNC: 3.9 MMOL/L — SIGNIFICANT CHANGE UP (ref 3.5–5.3)
RBC # BLD: 3.91 M/UL — SIGNIFICANT CHANGE UP (ref 3.8–5.2)
RBC # FLD: 16 % — HIGH (ref 10.3–14.5)
SODIUM SERPL-SCNC: 141 MMOL/L — SIGNIFICANT CHANGE UP (ref 135–145)
TRIGL SERPL-MCNC: 92 MG/DL — SIGNIFICANT CHANGE UP
WBC # BLD: 5.96 K/UL — SIGNIFICANT CHANGE UP (ref 3.8–10.5)
WBC # FLD AUTO: 5.96 K/UL — SIGNIFICANT CHANGE UP (ref 3.8–10.5)

## 2024-01-30 PROCEDURE — 99232 SBSQ HOSP IP/OBS MODERATE 35: CPT

## 2024-01-30 PROCEDURE — 93880 EXTRACRANIAL BILAT STUDY: CPT | Mod: 26

## 2024-01-30 PROCEDURE — 99233 SBSQ HOSP IP/OBS HIGH 50: CPT

## 2024-01-30 PROCEDURE — 74178 CT ABD&PLV WO CNTR FLWD CNTR: CPT | Mod: 26

## 2024-01-30 PROCEDURE — 71270 CT THORAX DX C-/C+: CPT | Mod: 26

## 2024-01-30 RX ORDER — THIAMINE MONONITRATE (VIT B1) 100 MG
100 TABLET ORAL DAILY
Refills: 0 | Status: DISCONTINUED | OUTPATIENT
Start: 2024-01-30 | End: 2024-02-01

## 2024-01-30 RX ORDER — ASPIRIN/CALCIUM CARB/MAGNESIUM 324 MG
81 TABLET ORAL DAILY
Refills: 0 | Status: DISCONTINUED | OUTPATIENT
Start: 2024-01-30 | End: 2024-02-01

## 2024-01-30 RX ORDER — CLOPIDOGREL BISULFATE 75 MG/1
75 TABLET, FILM COATED ORAL DAILY
Refills: 0 | Status: DISCONTINUED | OUTPATIENT
Start: 2024-01-31 | End: 2024-02-01

## 2024-01-30 RX ORDER — CLOPIDOGREL BISULFATE 75 MG/1
300 TABLET, FILM COATED ORAL ONCE
Refills: 0 | Status: COMPLETED | OUTPATIENT
Start: 2024-01-30 | End: 2024-01-30

## 2024-01-30 RX ADMIN — ENOXAPARIN SODIUM 40 MILLIGRAM(S): 100 INJECTION SUBCUTANEOUS at 08:44

## 2024-01-30 RX ADMIN — CLOPIDOGREL BISULFATE 300 MILLIGRAM(S): 75 TABLET, FILM COATED ORAL at 18:16

## 2024-01-30 RX ADMIN — Medication 1 TABLET(S): at 13:08

## 2024-01-30 RX ADMIN — Medication 100 MILLIGRAM(S): at 13:08

## 2024-01-30 RX ADMIN — Medication 210 MILLIGRAM(S): at 13:15

## 2024-01-30 RX ADMIN — Medication 81 MILLIGRAM(S): at 23:16

## 2024-01-30 RX ADMIN — Medication 1: at 13:08

## 2024-01-30 RX ADMIN — Medication 210 MILLIGRAM(S): at 05:21

## 2024-01-30 RX ADMIN — PANTOPRAZOLE SODIUM 40 MILLIGRAM(S): 20 TABLET, DELAYED RELEASE ORAL at 05:23

## 2024-01-30 RX ADMIN — Medication 1 MILLIGRAM(S): at 13:08

## 2024-01-30 RX ADMIN — Medication 100 MILLIGRAM(S): at 23:17

## 2024-01-30 NOTE — PROGRESS NOTE ADULT - TIME BILLING
I was present with the Resident/ACP during the key portions of the history and exam. I discussed the case with the Resident/ACP and agree with the findings and plan as documented in the Resident's/ACP's note, unless noted below.   ROS otherwise negative

## 2024-01-30 NOTE — PROGRESS NOTE ADULT - ASSESSMENT
Brionna Mendoza is a 74yo woman with a PMHx significant for HTN, alcohol abuse, presenting due to concerns of odd behaviors, difficulty with ambulation, slow response to questions, and headaches in setting of alcohol use. Neurology was consulted in regards to CT head concerning for possible straight sinus thrombosis. NIHSS on initial eval was 2 for dysarthria and wrong age. MRI brain and MR venogram performed showing no evidence of VST but showed acute infarcts in multiple territories. Neuro exam while here has been stable. Was not tenecteplase candidate, outside window. Was not thrombectomy candidate given clinical exam not consistent with large vessel occlusion.      IMPRESSION   Reported odd behaviors, disorientation, difficulty with ambulation, increased response latency, and headaches concerning for generalized vs multifocal cerebral dysfunction found on MRI to have multiple acute foci of infarcts in multiple vascular distributions. Mechanism embolic stroke of undetermined source.      RECOMMENDATION   [ ] From neurovascular standpoint, recommend starting aspirin 81mg daily and 3 weeks of plavix 75mg daily (with one time plavix load of 300mg). After 3 weeks, patient would continue aspirin 81mg daily monotherapy unless workup reveals otherwise.   [ ] Atorvastatin 40-80 mg daily titrated per LDL < 70   [ ] HgbA1C, fasting lipid panel, CBC, CMP, coag panel, troponin  [ ] Carotid ultrasound b/l. Can also order MR angio head/neck (can be done either with or without contrast - depending on if contrast contraindicated) but MRI should not hold up discharge - but in this case should still get carotid u/s while here.   [ ] TTE 1/26/24: was technically difficult study. Therefore recommend PADMINI for cardioembolic source of emboli.    [ ] CT chest, abdomen, pelvis w/w/o con for malignancy workup  [ ] patient will need ILR  [ ] tele     - Glucose control   - Stroke education and counseling  - Neuro-checks and VS q4h  - Gradual normotension  - Dysphagia screen. If fails, speech/swallow eval  - aspiration, fall precautions  - STAT CT head non-contrast for change in neuro exam.   - PT/ OT / DVT ppx per primary team (but chemical DVT ppx okay from neuro end)    Patient was seen on rounds with neuro attending. Recommendations above in agreement with neuro attending at time of note documentation and any resident documentation updates to note. Reviewed relevant neurologic imaging and findings with patient and primary team. Discussed patient care with primary team, patient and in agreement. Considered risks/benefits of diagnostic, treatment options. Recommendations finalized/addended once signed by attending.     Brionna Mendoza is a 74yo woman with a PMHx significant for HTN, alcohol abuse, presenting due to concerns of odd behaviors, difficulty with ambulation, slow response to questions, and headaches in setting of alcohol use. Neurology was consulted in regards to CT head concerning for possible straight sinus thrombosis. NIHSS on initial eval was 2 for dysarthria and wrong age. MRI brain and MR venogram performed showing no evidence of VST but showed acute infarcts in multiple territories. Neuro exam while here has been stable. Was not tenecteplase candidate, outside window. Was not thrombectomy candidate given clinical exam not consistent with large vessel occlusion.      IMPRESSION   Mental status has significantly improved, probably back to baseline. Reported odd behaviors, disorientation, difficulty with ambulation, increased response latency, and headaches concerning for generalized vs multifocal cerebral dysfunction found on MRI to have multiple acute foci of infarcts in multiple vascular distributions. Mechanism embolic stroke of undetermined source, including the possibility of hypercoagulability of occult malignancy      RECOMMENDATION   [ ] From neurovascular standpoint, recommend starting aspirin 81mg daily and 3 weeks of plavix 75mg daily (with one time plavix load of 300mg). After 3 weeks, patient would continue aspirin 81mg daily monotherapy unless workup reveals otherwise.   [ ] Atorvastatin 40-80 mg daily titrated per LDL < 70   [ ] HgbA1C, fasting lipid panel, CBC, CMP, coag panel, troponin  [ ] Carotid ultrasound b/l. Can also order MR angio head without contrast/neck witcontrast - depending on if contrast contraindicated) but MRI should not hold up discharge - but in this case should still get carotid u/s while here.   [ ] TTE 1/26/24: was technically difficult study. Therefore recommend PADMINI for cardioembolic source of emboli.    [ ] CT chest, abdomen, pelvis w/w/o con   [ ] patient will need ILR  [ ] tele     - Glucose control   - Stroke education and counseling  - Neuro-checks and VS q4h  - Gradual normotension  - Dysphagia screen. If fails, speech/swallow eval  - aspiration, fall precautions  - STAT CT head non-contrast for change in neuro exam.   - PT/ OT / DVT ppx per primary team (but chemical DVT ppx okay from neuro end)    Patient was seen on rounds with neuro attending. Recommendations above in agreement with neuro attending at time of note documentation and any resident documentation updates to note. Reviewed relevant neurologic imaging and findings with patient and primary team. Discussed patient care with primary team, patient and in agreement. Considered risks/benefits of diagnostic, treatment options. Recommendations finalized/addended once signed by attending.

## 2024-01-31 LAB
ANION GAP SERPL CALC-SCNC: 13 MMOL/L — SIGNIFICANT CHANGE UP (ref 7–14)
BASOPHILS # BLD AUTO: 0.06 K/UL — SIGNIFICANT CHANGE UP (ref 0–0.2)
BASOPHILS NFR BLD AUTO: 0.9 % — SIGNIFICANT CHANGE UP (ref 0–2)
BUN SERPL-MCNC: 18 MG/DL — SIGNIFICANT CHANGE UP (ref 7–23)
CALCIUM SERPL-MCNC: 8.9 MG/DL — SIGNIFICANT CHANGE UP (ref 8.4–10.5)
CHLORIDE SERPL-SCNC: 106 MMOL/L — SIGNIFICANT CHANGE UP (ref 98–107)
CO2 SERPL-SCNC: 23 MMOL/L — SIGNIFICANT CHANGE UP (ref 22–31)
CREAT SERPL-MCNC: 1.01 MG/DL — SIGNIFICANT CHANGE UP (ref 0.5–1.3)
EGFR: 60 ML/MIN/1.73M2 — SIGNIFICANT CHANGE UP
EOSINOPHIL # BLD AUTO: 0.13 K/UL — SIGNIFICANT CHANGE UP (ref 0–0.5)
EOSINOPHIL NFR BLD AUTO: 2 % — SIGNIFICANT CHANGE UP (ref 0–6)
GLUCOSE BLDC GLUCOMTR-MCNC: 144 MG/DL — HIGH (ref 70–99)
GLUCOSE BLDC GLUCOMTR-MCNC: 148 MG/DL — HIGH (ref 70–99)
GLUCOSE BLDC GLUCOMTR-MCNC: 154 MG/DL — HIGH (ref 70–99)
GLUCOSE BLDC GLUCOMTR-MCNC: 234 MG/DL — HIGH (ref 70–99)
GLUCOSE SERPL-MCNC: 141 MG/DL — HIGH (ref 70–99)
HCT VFR BLD CALC: 34.2 % — LOW (ref 34.5–45)
HGB BLD-MCNC: 11 G/DL — LOW (ref 11.5–15.5)
IANC: 3.28 K/UL — SIGNIFICANT CHANGE UP (ref 1.8–7.4)
IMM GRANULOCYTES NFR BLD AUTO: 0.3 % — SIGNIFICANT CHANGE UP (ref 0–0.9)
LYMPHOCYTES # BLD AUTO: 1.91 K/UL — SIGNIFICANT CHANGE UP (ref 1–3.3)
LYMPHOCYTES # BLD AUTO: 30 % — SIGNIFICANT CHANGE UP (ref 13–44)
MAGNESIUM SERPL-MCNC: 1.8 MG/DL — SIGNIFICANT CHANGE UP (ref 1.6–2.6)
MCHC RBC-ENTMCNC: 27.4 PG — SIGNIFICANT CHANGE UP (ref 27–34)
MCHC RBC-ENTMCNC: 32.2 GM/DL — SIGNIFICANT CHANGE UP (ref 32–36)
MCV RBC AUTO: 85.1 FL — SIGNIFICANT CHANGE UP (ref 80–100)
MONOCYTES # BLD AUTO: 0.97 K/UL — HIGH (ref 0–0.9)
MONOCYTES NFR BLD AUTO: 15.2 % — HIGH (ref 2–14)
NEUTROPHILS # BLD AUTO: 3.28 K/UL — SIGNIFICANT CHANGE UP (ref 1.8–7.4)
NEUTROPHILS NFR BLD AUTO: 51.6 % — SIGNIFICANT CHANGE UP (ref 43–77)
NRBC # BLD: 0 /100 WBCS — SIGNIFICANT CHANGE UP (ref 0–0)
NRBC # FLD: 0 K/UL — SIGNIFICANT CHANGE UP (ref 0–0)
PHOSPHATE SERPL-MCNC: 4.3 MG/DL — SIGNIFICANT CHANGE UP (ref 2.5–4.5)
PLATELET # BLD AUTO: 225 K/UL — SIGNIFICANT CHANGE UP (ref 150–400)
POTASSIUM SERPL-MCNC: 5 MMOL/L — SIGNIFICANT CHANGE UP (ref 3.5–5.3)
POTASSIUM SERPL-SCNC: 5 MMOL/L — SIGNIFICANT CHANGE UP (ref 3.5–5.3)
PYRIDOXAL PHOS SERPL-MCNC: 9.7 UG/L — SIGNIFICANT CHANGE UP (ref 3.4–65.2)
RBC # BLD: 4.02 M/UL — SIGNIFICANT CHANGE UP (ref 3.8–5.2)
RBC # FLD: 16.2 % — HIGH (ref 10.3–14.5)
SODIUM SERPL-SCNC: 142 MMOL/L — SIGNIFICANT CHANGE UP (ref 135–145)
VIT B1 SERPL-MCNC: 294.7 NMOL/L — HIGH (ref 66.5–200)
WBC # BLD: 6.37 K/UL — SIGNIFICANT CHANGE UP (ref 3.8–10.5)
WBC # FLD AUTO: 6.37 K/UL — SIGNIFICANT CHANGE UP (ref 3.8–10.5)

## 2024-01-31 PROCEDURE — 99222 1ST HOSP IP/OBS MODERATE 55: CPT

## 2024-01-31 PROCEDURE — 99232 SBSQ HOSP IP/OBS MODERATE 35: CPT

## 2024-01-31 PROCEDURE — 93325 DOPPLER ECHO COLOR FLOW MAPG: CPT | Mod: 26,GC

## 2024-01-31 PROCEDURE — 93312 ECHO TRANSESOPHAGEAL: CPT | Mod: 26

## 2024-01-31 PROCEDURE — 93320 DOPPLER ECHO COMPLETE: CPT | Mod: 26,GC

## 2024-01-31 RX ADMIN — PANTOPRAZOLE SODIUM 40 MILLIGRAM(S): 20 TABLET, DELAYED RELEASE ORAL at 05:38

## 2024-01-31 RX ADMIN — ENOXAPARIN SODIUM 40 MILLIGRAM(S): 100 INJECTION SUBCUTANEOUS at 08:10

## 2024-01-31 RX ADMIN — Medication 1: at 17:12

## 2024-01-31 NOTE — PROGRESS NOTE ADULT - PROBLEM SELECTOR PLAN 5
- per med rec pharm, no recent fills of allopurinol 100mg qd or colchicine  - can continue low dose prevention with allopurinol 100mg
-resume allopurinol 100mg qd  -need to confirm if on colchicine
- per med rec pharm, no recent fills of allopurinol 100mg qd or colchicine  - can continue low dose prevention with allopurinol 100mg
- per med rec pharm, no recent fills of allopurinol 100mg qd or colchicine  - can continue low dose prevention with allopurinol 100mg
-resume allopurinol 100mg qd  -need to confirm if on colchicine
-resume allopurinol 100mg qd  -need to confirm if on colchicine

## 2024-01-31 NOTE — PROGRESS NOTE ADULT - PROBLEM SELECTOR PROBLEM 2
Alcohol abuse
Acute cerebrovascular accident (CVA)
Alcohol abuse
Alcohol abuse
Acute cerebrovascular accident (CVA)
Alcohol abuse

## 2024-01-31 NOTE — PROGRESS NOTE ADULT - PROBLEM SELECTOR PLAN 3
- Drinks one bottle of saniya per day, last drink 1/24 4PM. Denies withdrawal sx at this time. Comfortable appearing  - MV, FA, thiamine as above  - CIWA with sx triggered ativan
CTH: Hyperdense straight sinus and vein of Goyo suspicious for central venous thrombosis. Currently asymptomatic and no focal deficits. No HA at this time.   -Seen by neuro and recommended no intervention and obtain MRI to confirm. MRI brain without contrast and MRV H/N with IV contrast
- Drinks one bottle of saniya per day, last drink 1/24 4PM. Denies withdrawal sx at this time. Comfortable appearing  - MV, FA, thiamine as above  - CIWA with sx triggered ativan
CTH: Hyperdense straight sinus and vein of Goyo suspicious for central venous thrombosis. Currently asymptomatic and no focal deficits. No HA at this time.   -Seen by neuro and recommended no intervention and obtain MRI to confirm. MRI brain without contrast and MRV H/N with IV contrast

## 2024-01-31 NOTE — PROGRESS NOTE ADULT - PROBLEM SELECTOR PLAN 7
sinus tachycardia @ 107bpm, biatrial enlargement, downsloping/STD? ST segment in lateral leads, II, AVF  -tele, echo ordered  -no CP, SOB or other cardiac complaints
sinus tachycardia @ 107bpm, biatrial enlargement, downsloping/STD? ST segment in lateral leads, II, AVF  -tele, echo ordered  -no CP, SOB or other cardiac complaints
sinus tachycardia @ 107bpm, biatrial enlargement, downsloping/STD? ST segment in lateral leads, II, AVF  -no sig events on tele  -echo was technically difficult  - PADMINI ordered   -no CP, SOB or other cardiac complaints  - ctm
sinus tachycardia @ 107bpm, biatrial enlargement, downsloping/STD? ST segment in lateral leads, II, AVF  -no sig events on tele  -echo was technically difficult  -no CP, SOB or other cardiac complaints  - ctm
sinus tachycardia @ 107bpm, biatrial enlargement, downsloping/STD? ST segment in lateral leads, II, AVF  -tele, echo ordered  Telemetry- SR , Echo 1. Technically very difficult study.   2. Technically difficult image quality.   3. Left ventricular cavity is normal. Left ventricular wall thickness is normal.   4. Left ventricle was not well visualized.   5. Unable to evaluate left ventricular ejection fraction.   6. The right ventricle is not well visualized.   7. Structurally normal mitral valve with normal leaflet excursion. There is calcification of the mitral valve annulus. There is trace mitral regurgitation.    -no CP, SOB or other cardiac complaints
sinus tachycardia @ 107bpm, biatrial enlargement, downsloping/STD? ST segment in lateral leads, II, AVF  -no sig events on tele  -echo was technically difficult  - PADMINI without significant findings   -no CP, SOB or other cardiac complaints  - ctm

## 2024-01-31 NOTE — PROGRESS NOTE ADULT - PROBLEM SELECTOR PLAN 6
On metformin - hold inpatient. Not on insulin at home  -A1C, ISS

## 2024-01-31 NOTE — PROGRESS NOTE ADULT - PROBLEM SELECTOR PLAN 8
Lovenox 40mg qd  DASH/TLC CC diet
Lovenox 40mg qd  DASH/TLC CC diet    Dispo once ILR is placed
Lovenox 40mg qd  DASH/TLC CC diet
Lovenox 40mg qd  DASH/TLC CC diet

## 2024-01-31 NOTE — CONSULT NOTE ADULT - NS ATTEND AMEND GEN_ALL_CORE FT
73F with PMHx HTN, gout, ETOH abuse, DM2, GERD brought in by sister for confusion. CT head demonstrated possible straight sinus thrombosis. MRI brain and MR venogram performed showed acute infarcts in multiple territories. EP was called for ILR placement. There is no telemetry evidence of atrial arrhythmias; nor is there a clear pacing indication at this time.  Per [facility], patient will benefit from prolonged monitoring for detection of AF/PAF as cause of potential cardioembolic source.  ILR implantation for prolonged monitoring for detection of suspected, asymptomatic AF/PAF advised as 2D TTE is without SABRINA clot. Risks, benefits, and alternatives discussed with patient and she wants to discuss with her sister.

## 2024-01-31 NOTE — PROGRESS NOTE ADULT - PROBLEM SELECTOR PLAN 2
- MRI now shows multiple small foci of acute infarction scattered within multiple vascular distributions of the cerebral hemispheric white matter. No evidence of dural sinus thrombosis.  - neurology consulted, recs appreciated   - start aspirin 81mg daily and 3 weeks of plavix 75mg daily (with one time plavix load of 300mg). After 3 weeks, patient would continue aspirin 81mg daily monotherapy   - LDL 71, A1c 6.4   - carotid US completed: no sig stenosis   - PADMINI ordered: No intracardiac source of embolism  - CT chest, abdomen, pelvis w/w/o con without source of malignancy   - ordered MR angio head/neck with contrast, but can be done as OP   - EP consulted for ILR  - continue tele
Drinks one bottle of saniya per day, last drink 1/24 4PM. Denies withdrawal sx at this time. Comfortable appearing  -MV, FA, thiamine as above  -CIWA with sx triggered ativan
Drinks one bottle of saniya per day, last drink 1/24 4PM. Denies withdrawal sx at this time. Comfortable appearing  -MV, FA, thiamine as above  -CIWA with sx triggered ativan
- MRI now shows multiple small foci of acute infarction scattered within multiple vascular distributions of the cerebral hemispheric white matter. No evidence of dural sinus thrombosis.  - neurology consulted, recs appreciated   - start aspirin 81mg daily and 3 weeks of plavix 75mg daily (with one time plavix load of 300mg). After 3 weeks, patient would continue aspirin 81mg daily monotherapy   - LDL 71, A1c 6.4   - carotid US completed: no sig stenosis   - ordered MR angio head/neck with contrast   - PADMINI ordered   - CT chest, abdomen, pelvis w/w/o con ordered   - EP consult for ILR   - continue tele
Drinks one bottle of saniya per day, last drink 1/24 4PM. Denies withdrawal sx at this time. Comfortable appearing  -MV, FA, thiamine as above  -CIWA with sx triggered ativan
Drinks one bottle of saniya per day, last drink 1/24 4PM. Denies withdrawal sx at this time. Comfortable appearing  -MV, FA, thiamine as above  -CIWA with sx triggered ativan

## 2024-01-31 NOTE — PROGRESS NOTE ADULT - PROBLEM SELECTOR PLAN 4
- not on any meds at home   - normotensive
Does not appear to be on meds per list from sister  [facility]
- not on any meds at home   - normotensive
- not on any meds at home   - normotensive

## 2024-01-31 NOTE — PROGRESS NOTE ADULT - PROBLEM SELECTOR PLAN 1
Odd behavior at home. Here without complaints at this time and oriented x3. Hx of ataxia as well and ETOH abuse. Possible wernicke's encephalopathy   -IV thiamine 500mg q8. (dose given prior to lab draw so thiamine level likely to be inaccurate)  -folate, MV  -CTH: Hyperdense straight sinus and vein of Goyo suspicious for central venous thrombosis. Currently asymptomatic and no focal deficits. No HA at this time. Seen by neuro and recommended no intervention and obtain MRI to confirm  -check UA (neg), Utox, ETOH level (neg), TSH, vitamin B1, B6, B12, folate, lactate, creatine kinase, ammonia (nl)  -toradol, reglan, benadryl if needed for HA if it returns  -no photophobia, no fever, no meningeal signs. Do not suspect meningitis
- Odd behavior at home. Here without complaints at this time and oriented x3. Hx of ataxia as well and ETOH abuse. - c/f wernicke's encephalopathy vs CVA    - s/p IV thiamine 500mg q8 for 3+ days, now on thiamine 100 mg daily   - continue folate, MV  - CTH: Hyperdense straight sinus and vein of Goyo suspicious for central venous thrombosis. Currently asymptomatic and no focal deficits.   - MRI now shows multiple small foci of acute infarction scattered within multiple vascular distributions of the cerebral hemispheric white matter. No evidence of dural sinus thrombosis. Plan as below   -UA (neg), ETOH level (neg), TSH WNL, ammonia (nl),  folate/ b12 WNL   -no photophobia, no fever, no meningeal signs. Do not suspect meningitis
Odd behavior at home. Here without complaints at this time and oriented x3. Hx of ataxia as well and ETOH abuse. Possible wernicke's encephalopathy   -IV thiamine 500mg q8. (dose given prior to lab draw so thiamine level likely to be inaccurate)  -folate, MV  -CTH: Hyperdense straight sinus and vein of Goyo suspicious for central venous thrombosis. Currently asymptomatic and no focal deficits. No HA at this time. Seen by neuro and recommended no intervention and obtain MRI to confirm  -check UA (neg), Utox, ETOH level (neg), TSH, vitamin B1, B6, B12, folate, lactate, creatine kinase, ammonia (nl)  -toradol, reglan, benadryl if needed for HA if it returns  -no photophobia, no fever, no meningeal signs. Do not suspect meningitis
- Odd behavior at home. Here without complaints at this time and oriented x3. Hx of ataxia as well and ETOH abuse. - c/f wernicke's encephalopathy vs CVA    - s/p IV thiamine 500mg q8 for 3+ days, now on thiamine 100 mg daily   - continue folate, MV  - CTH: Hyperdense straight sinus and vein of Goyo suspicious for central venous thrombosis. Currently asymptomatic and no focal deficits.   - MRI now shows multiple small foci of acute infarction scattered within multiple vascular distributions of the cerebral hemispheric white matter. No evidence of dural sinus thrombosis. Plan as below   -UA (neg), ETOH level (neg), TSH WNL, ammonia (nl),  folate/ b12 WNL   -no photophobia, no fever, no meningeal signs. Do not suspect meningitis

## 2024-01-31 NOTE — PROGRESS NOTE ADULT - PROBLEM SELECTOR PROBLEM 6
Type 2 diabetes mellitus with hyperglycemia, without long-term current use of insulin

## 2024-01-31 NOTE — PROGRESS NOTE ADULT - SUBJECTIVE AND OBJECTIVE BOX
Melina Suggs MD  [facility] Division of Hospital Medicine  Pager: [phone]  Available via MS Teams    SUBJECTIVE / OVERNIGHT EVENTS:    No new concern for today     MEDICATIONS  (STANDING):  allopurinol 100 milliGRAM(s) Oral daily  aspirin  chewable 81 milliGRAM(s) Oral daily  clopidogrel Tablet 75 milliGRAM(s) Oral daily  dextrose 5%. 1000 milliLiter(s) (50 mL/Hr) IV Continuous <Continuous>  dextrose 5%. 1000 milliLiter(s) (100 mL/Hr) IV Continuous <Continuous>  dextrose 50% Injectable 25 Gram(s) IV Push once  dextrose 50% Injectable 25 Gram(s) IV Push once  dextrose 50% Injectable 12.5 Gram(s) IV Push once  enoxaparin Injectable 40 milliGRAM(s) SubCutaneous every 24 hours  folic acid 1 milliGRAM(s) Oral daily  glucagon  Injectable 1 milliGRAM(s) IntraMuscular once  insulin lispro (ADMELOG) corrective regimen sliding scale   SubCutaneous at bedtime  insulin lispro (ADMELOG) corrective regimen sliding scale   SubCutaneous three times a day before meals  multivitamin 1 Tablet(s) Oral daily  pantoprazole    Tablet 40 milliGRAM(s) Oral before breakfast  thiamine 100 milliGRAM(s) Oral daily    MEDICATIONS  (PRN):  acetaminophen     Tablet .. 650 milliGRAM(s) Oral every 6 hours PRN Temp greater or equal to 38C (100.4F), Mild Pain (1 - 3)  dextrose Oral Gel 15 Gram(s) Oral once PRN Blood Glucose LESS THAN 70 milliGRAM(s)/deciliter  melatonin 3 milliGRAM(s) Oral at bedtime PRN Insomnia      I&O's Summary    30 Jan 2024 07:01  -  31 Jan 2024 07:00  --------------------------------------------------------  IN: 620 mL / OUT: 0 mL / NET: 620 mL    31 Jan 2024 07:01  -  31 Jan 2024 15:02  --------------------------------------------------------  IN: 0 mL / OUT: 1 mL / NET: -1 mL        PHYSICAL EXAM:  Vital Signs Last 24 Hrs  T(C): 36.9 (31 Jan 2024 12:00), Max: 36.9 (31 Jan 2024 12:00)  T(F): 98.4 (31 Jan 2024 12:00), Max: 98.4 (31 Jan 2024 12:00)  HR: 71 (31 Jan 2024 12:00) (69 - 94)  BP: 126/81 (31 Jan 2024 12:00) (126/81 - 139/81)  BP(mean): --  RR: 17 (31 Jan 2024 12:00) (16 - 17)  SpO2: 97% (31 Jan 2024 12:00) (96% - 100%)    Parameters below as of 31 Jan 2024 12:00  Patient On (Oxygen Delivery Method): room air      CONSTITUTIONAL: NAD   EYES: conjunctiva and sclera clear  ENMT: Moist oral mucosa   NECK: Supple   RESPIRATORY: Normal respiratory effort; lungs are clear to auscultation bilaterally  CARDIOVASCULAR: Regular rate and rhythm, normal S1 and S2, no murmur/rub/gallop; Peripheral pulses are 2+ bilaterally  ABDOMEN: Nontender to palpation, normoactive bowel sounds, no rebound/guarding   MUSCULOSKELETAL: No lower extremity edema   PSYCH: A+O to person, place, and time; affect appropriate  NEUROLOGY: moves all extremities   SKIN: No rashes    LABS:                        11.0   6.37  )-----------( 225      ( 31 Jan 2024 05:30 )             34.2     01-31    142  |  106  |  18  ----------------------------<  141<H>  5.0   |  23  |  1.01    Ca    8.9      31 Jan 2024 05:30  Phos  4.3     01-31  Mg     1.80     01-31            Urinalysis Basic - ( 31 Jan 2024 05:30 )    Color: x / Appearance: x / SG: x / pH: x  Gluc: 141 mg/dL / Ketone: x  / Bili: x / Urobili: x   Blood: x / Protein: x / Nitrite: x   Leuk Esterase: x / RBC: x / WBC x   Sq Epi: x / Non Sq Epi: x / Bacteria: x            RADIOLOGY & ADDITIONAL TESTS:  Other Results Reviewed Today: BMP with stable Cr, CBC with stable Hg     COORDINATION OF CARE:  Communication: discussed plan of care with ACP 
Vascular Neurology Progress Note    SUBJECTIVE/OBJECTIVE/INTERVAL EVENTS: Patient seen and examined at bedside w/ neuro attending Dr Richard Libman. Patient reports that she feels better, "nothing bad in me." Reported having some headache during hospital course but it resolved and states she feels back to her baseline. Otherwise denies any new neurologic symptoms since being here. This includes but not limited to new headache, dizziness, vision changes, speech changes, focal extremity weakness/numbness. Overall she feels clinically better.       MEDICATIONS  (STANDING):  allopurinol 100 milliGRAM(s) Oral daily  dextrose 5%. 1000 milliLiter(s) (50 mL/Hr) IV Continuous <Continuous>  dextrose 5%. 1000 milliLiter(s) (100 mL/Hr) IV Continuous <Continuous>  dextrose 50% Injectable 25 Gram(s) IV Push once  dextrose 50% Injectable 12.5 Gram(s) IV Push once  dextrose 50% Injectable 25 Gram(s) IV Push once  enoxaparin Injectable 40 milliGRAM(s) SubCutaneous every 24 hours  folic acid 1 milliGRAM(s) Oral daily  glucagon  Injectable 1 milliGRAM(s) IntraMuscular once  insulin lispro (ADMELOG) corrective regimen sliding scale   SubCutaneous three times a day before meals  insulin lispro (ADMELOG) corrective regimen sliding scale   SubCutaneous at bedtime  multivitamin 1 Tablet(s) Oral daily  pantoprazole    Tablet 40 milliGRAM(s) Oral before breakfast  thiamine IVPB 500 milliGRAM(s) IV Intermittent every 8 hours    MEDICATIONS  (PRN):  acetaminophen     Tablet .. 650 milliGRAM(s) Oral every 6 hours PRN Temp greater or equal to 38C (100.4F), Mild Pain (1 - 3)  dextrose Oral Gel 15 Gram(s) Oral once PRN Blood Glucose LESS THAN 70 milliGRAM(s)/deciliter  melatonin 3 milliGRAM(s) Oral at bedtime PRN Insomnia      VITALS & EXAMINATION:  Vital Signs Last 24 Hrs  T(C): 37.1 (30 Jan 2024 05:20), Max: 37.1 (30 Jan 2024 05:20)  T(F): 98.7 (30 Jan 2024 05:20), Max: 98.7 (30 Jan 2024 05:20)  HR: 63 (30 Jan 2024 05:20) (63 - 77)  BP: 145/74 (30 Jan 2024 05:20) (137/72 - 145/74)  BP(mean): --  RR: 16 (30 Jan 2024 05:20) (16 - 18)  SpO2: 100% (30 Jan 2024 05:20) (100% - 100%)    Parameters below as of 30 Jan 2024 05:20  Patient On (Oxygen Delivery Method): room air    Physical Examination:   General - NAD, pleasant, cooperative   Head - normocephalic  Eyes - clear sclera  Resp - regular breathing      Neurologic Exam:  Mental status - Awake, Alert, Oriented to person, place, situation, month, year, president of [place]. Able to ID objects. Attends to examiner. Follows simple and complex commands.   Language - able to repeat, is fluent, able to comprehend.     Cranial nerves:  CN II: Visual fields are full to confrontation. Pupils are 3 mm and briskly reactive to light.   CN III, IV, VI: EOMI, no nystagmus, no ptosis  CN V: Facial sensation is intact to pinprick in all 3 divisions bilaterally.  CN VII: Face is symmetric with normal eye closure and smile.  CN VII: Hearing is normal to rubbing fingers  CN IX, X: Palate elevates symmetrically.    CN XI: Head turning and shoulder shrug are intact  CN XII: Tongue is midline with normal movements and no atrophy.    Motor - Normal bulk and tone throughout. No pronator drift of out-stretched arms. No asterixis. Has small action tremor.   Strength testing            Deltoid(C5)  Biceps(C6)    Triceps(C7)        R            5                 5                        5                     5                                                     L             5                 5                        5                     5                                                           Hip Flexion(L2/3)       Knee Flexion (L4/5/S1)    Knee Extension (L3/4)   Dorsiflexion (L4/5)   Plantar Flexion (S1)  R              5                                    5                                     5                             5                         5                            L              5                                     5                                     5                            5                          5                              Sensation - Light touch intact in fingers and toes   Coordination - Rapid alternating movements and fine finger movements are intact. There is no dysmetria on finger-to-nose.       LABORATORY:  CBC                       10.6   5.96  )-----------( 221      ( 30 Jan 2024 04:55 )             32.6     Chem 01-30    141  |  105  |  18  ----------------------------<  138<H>  3.9   |  25  |  0.99    Ca    8.7      30 Jan 2024 04:55  Phos  3.7     01-30  Mg     1.70     01-30    LFTs   Coagulopathy   Lipid Panel   A1c   Cardiac enzymes     U/A Urinalysis Basic - ( 30 Jan 2024 04:55 )    Color: x / Appearance: x / SG: x / pH: x  Gluc: 138 mg/dL / Ketone: x  / Bili: x / Urobili: x   Blood: x / Protein: x / Nitrite: x   Leuk Esterase: x / RBC: x / WBC x   Sq Epi: x / Non Sq Epi: x / Bacteria: x      CSF  Immunological  Other    STUDIES & IMAGING: (EEG, CT, MR, U/S, TTE/PADMINI):    < from: MR Venogram Neck w/ IV Cont (01.29.24 @ 18:11) >    ACC: [id number] EXAM:  MR VENOGRAM BRAIN IC   ORDERED BY: KARISHMA LEHMAN     ACC: [id number] EXAM:  MR VENOGRAM NECK IC   ORDERED BY: KARISHMA LEHMAN     ACC: [id number] EXAM:  MR BRAIN   ORDERED BY: KARISHMA LEHMAN     PROCEDURE DATE:  01/29/2024          INTERPRETATION:  Three examinations were performed on this patient:  1.  MR of the brain without without gadolinium contrast  2.  MR venogram of the brain with and without gadolinium contrast  3.  MR venogram of the neck with and without gadolinium contrast    CLINICAL INFORMATION:   AMS  LMR  Admitting Dxs: E51.2 E51.2    TECHNIQUE:     MR brain:   Sagittal and axial T1-weighted images, axial FLAIR images,   axial susceptibility-weighted/gradient echo images, axial T2-weighted   images and axial diffusion weighted images of the brain were obtained.    MRV brain:   Three-dimensional time of flight MR venogram was performed   in the coronal plane.  Three-dimensional time-of-flight MR angiography   was performed in the sagittal plane following administration  gadolinium.    Post processing angiographic reconstruction of images was performed.     Each data set was reconstructed as maximum intensity pixel images and   displayed in multiple rotations.    MRV neck:  Three-dimensional time of flight MR venogram was performed   in the coronal plane.  Three-dimensional time-of-flight MR angiography   was performed in the sagittal plane. A three-dimensional time-of-flight   MR angiogram acquisition was obtained in the neck. Gadolinium-enhanced MR   angiogram was obtained including delayed images in the venous phase. Post   processing angiographic reconstruction of images was performed.   Each   data set was reconstructed as maximum intensity pixel images and   displayed in multiple rotations.  CONTRAST:    7.5 cc administered  ;  0 cc discarded    COMPARISON:   None    FINDINGS:    MR BRAIN    BRAIN parenchyma:    The brain  demonstrates multiple locations of acute   infarction scattered in the cerebral hemispheres. These lesions are found   at multiple vascular distributions. The lesions are noted to be   hyperintense on the diffusion-weighted and long TR images, largely   inconspicuous on the ADC and T1 weighted images. The largest lesion may   be in the right mid body of the corpus callosum, hypointense in   T1-weighted images and inconspicuous on the ADC images. Many of the   lesions involve the subcortical white matter within the frontal and   parietal lobes of each cerebral hemisphere, more numerous on the right   than the left. There are also subcortical and subependymal lesions within   the right temporal lobe, also more extensive on the right than the left.   No cerebral cortical lesion is recognized.   Cerebral cortical gray-white   matter differentiation is maintained.  Noacute cerebral cortical infarct   is seen.  No intracranial hemorrhage is found.  No mass effect is found   in the brain. There are a few additional small indistinct lesions within   the cerebral hemispheric white matter that suggest ischemic white matter   disease. These lesions are hyperintense on the long TR images, otherwise   inconspicuous.    CSF SPACES:  The ventricles, sulci and basal cisterns appear mildly   dilated reflecting diffuse brain volume loss. Partially empty sella   configuration is noted.    HEAD AND NECK STRUCTURES:  The orbits are unremarkable.  The included   paranasal sinuses  are clear. The right frontal sinus is hypoplastic. The   nasal cavity appears intact.  The nasopharynx is symmetric.  The central   skull base is intact.  The temporal bones demonstrate patent petrous air   cells.  The calvarium appears unremarkable.    MRV BRAIN    The principal dural venous sinuses are patent.  This includes the   superior sagittal sinus anterior and posterior limbs.  The dominant   caliber right transverse is patent to the right sigmoid sinus and right   internal jugular vein. In its middle third there is a small ovoid focal   well-circumscribed focal defect, hyperintense on the long TR images,   typical for arachnoidgranulation. The smaller caliber left transverse is   diminutive from the torcula, increased in caliber distal to the inflow of   its vein of Nathan, patent to the left sigoid sinus and left internal   jugular vein. Asymmetry of the transverse sinus caliber is within the   limits of developmental variation.    Cerebral cortical veins have physiologic appearance and approximate   symmetry.    The internal cerebral veins, basal veins of Ravi, vein of Goyo and   straight sinus appear patent.    The cavernous sinuses appear symmetric and demonstrate no anomalous flow.     The superior ophthalmic veins are not dilated.    No anomalous vascularity is identified.    MRV NECK    The right internal jugular vein is patent from the skull base to the   thoracic inlet.    The left internal jugular vein is patent from the skull base to the   thoracic inlet.      IMPRESSION:    1.  BRAIN:   Multiple small foci of acute infarction scattered within   multiple vascular distributions of the cerebral hemispheric white matter    2.  INTRACRANIAL DURAL SINUSES:   No evidence of dural sinus thrombosis.    3. PRINCIPAL NECK VEINS:    Patent.   No evidence of neck venous   thrombosis.    --- End of Report ---    RIA PHILLIPS MD; Attending Radiologist  This document has been electronically signed. Jan 30 2024  7:18AM    < end of copied text >  
Melina Suggs MD  [facility] Division of Hospital Medicine  Pager: [phone]  Available via MS Teams    SUBJECTIVE / OVERNIGHT EVENTS:    No new complaints   No s/s of alcohol withdrawal, no tremors, hallucinations     MEDICATIONS  (STANDING):  allopurinol 100 milliGRAM(s) Oral daily  aspirin  chewable 81 milliGRAM(s) Oral daily  clopidogrel Tablet 300 milliGRAM(s) Oral once  dextrose 5%. 1000 milliLiter(s) (50 mL/Hr) IV Continuous <Continuous>  dextrose 5%. 1000 milliLiter(s) (100 mL/Hr) IV Continuous <Continuous>  dextrose 50% Injectable 25 Gram(s) IV Push once  dextrose 50% Injectable 12.5 Gram(s) IV Push once  dextrose 50% Injectable 25 Gram(s) IV Push once  enoxaparin Injectable 40 milliGRAM(s) SubCutaneous every 24 hours  folic acid 1 milliGRAM(s) Oral daily  glucagon  Injectable 1 milliGRAM(s) IntraMuscular once  insulin lispro (ADMELOG) corrective regimen sliding scale   SubCutaneous at bedtime  insulin lispro (ADMELOG) corrective regimen sliding scale   SubCutaneous three times a day before meals  multivitamin 1 Tablet(s) Oral daily  pantoprazole    Tablet 40 milliGRAM(s) Oral before breakfast  thiamine IVPB 500 milliGRAM(s) IV Intermittent every 8 hours    MEDICATIONS  (PRN):  acetaminophen     Tablet .. 650 milliGRAM(s) Oral every 6 hours PRN Temp greater or equal to 38C (100.4F), Mild Pain (1 - 3)  dextrose Oral Gel 15 Gram(s) Oral once PRN Blood Glucose LESS THAN 70 milliGRAM(s)/deciliter  melatonin 3 milliGRAM(s) Oral at bedtime PRN Insomnia      I&O's Summary    30 Jan 2024 07:01  -  30 Jan 2024 16:15  --------------------------------------------------------  IN: 620 mL / OUT: 0 mL / NET: 620 mL        PHYSICAL EXAM:  Vital Signs Last 24 Hrs  T(C): 36.7 (30 Jan 2024 13:00), Max: 37.1 (30 Jan 2024 05:20)  T(F): 98.1 (30 Jan 2024 13:00), Max: 98.7 (30 Jan 2024 05:20)  HR: 89 (30 Jan 2024 13:00) (63 - 89)  BP: 147/96 (30 Jan 2024 13:00) (139/70 - 147/96)  BP(mean): --  RR: 17 (30 Jan 2024 13:00) (16 - 18)  SpO2: 99% (30 Jan 2024 13:00) (99% - 100%)    Parameters below as of 30 Jan 2024 13:00  Patient On (Oxygen Delivery Method): room air      CONSTITUTIONAL: NAD   EYES: conjunctiva and sclera clear  ENMT: Moist oral mucosa   NECK: Supple   RESPIRATORY: Normal respiratory effort; lungs are clear to auscultation bilaterally  CARDIOVASCULAR: Regular rate and rhythm, normal S1 and S2, no murmur/rub/gallop; Peripheral pulses are 2+ bilaterally  ABDOMEN: Nontender to palpation, normoactive bowel sounds, no rebound/guarding   MUSCULOSKELETAL: No lower extremity edema   PSYCH: A+O to person, place, and time; affect appropriate  NEUROLOGY: moves all extremities   SKIN: No rashes    LABS:                        10.6   5.96  )-----------( 221      ( 30 Jan 2024 04:55 )             32.6     01-30    141  |  105  |  18  ----------------------------<  138<H>  3.9   |  25  |  0.99    Ca    8.7      30 Jan 2024 04:55  Phos  3.7     01-30  Mg     1.70     01-30            Urinalysis Basic - ( 30 Jan 2024 04:55 )    Color: x / Appearance: x / SG: x / pH: x  Gluc: 138 mg/dL / Ketone: x  / Bili: x / Urobili: x   Blood: x / Protein: x / Nitrite: x   Leuk Esterase: x / RBC: x / WBC x   Sq Epi: x / Non Sq Epi: x / Bacteria: x            RADIOLOGY & ADDITIONAL TESTS:  Other Results Reviewed Today: BMP with stable Cr, CBC with stable Hg     COORDINATION OF CARE:  Communication: discussed plan of care with ACP 
Melina Suggs MD  [facility] Division of Hospital Medicine  Pager: [phone]  Available via MS Teams    SUBJECTIVE / OVERNIGHT EVENTS:    no new complaints     MEDICATIONS  (STANDING):  allopurinol 100 milliGRAM(s) Oral daily  dextrose 5%. 1000 milliLiter(s) (50 mL/Hr) IV Continuous <Continuous>  dextrose 5%. 1000 milliLiter(s) (100 mL/Hr) IV Continuous <Continuous>  dextrose 50% Injectable 25 Gram(s) IV Push once  dextrose 50% Injectable 12.5 Gram(s) IV Push once  dextrose 50% Injectable 25 Gram(s) IV Push once  enoxaparin Injectable 40 milliGRAM(s) SubCutaneous every 24 hours  folic acid 1 milliGRAM(s) Oral daily  glucagon  Injectable 1 milliGRAM(s) IntraMuscular once  insulin lispro (ADMELOG) corrective regimen sliding scale   SubCutaneous at bedtime  insulin lispro (ADMELOG) corrective regimen sliding scale   SubCutaneous three times a day before meals  multivitamin 1 Tablet(s) Oral daily  pantoprazole    Tablet 40 milliGRAM(s) Oral before breakfast  thiamine IVPB 500 milliGRAM(s) IV Intermittent every 8 hours    MEDICATIONS  (PRN):  acetaminophen     Tablet .. 650 milliGRAM(s) Oral every 6 hours PRN Temp greater or equal to 38C (100.4F), Mild Pain (1 - 3)  dextrose Oral Gel 15 Gram(s) Oral once PRN Blood Glucose LESS THAN 70 milliGRAM(s)/deciliter  LORazepam   Injectable 2 milliGRAM(s) IntraMuscular every 2 hours PRN CIWA-Ar score 8 or greater  melatonin 3 milliGRAM(s) Oral at bedtime PRN Insomnia      I&O's Summary      PHYSICAL EXAM:  Vital Signs Last 24 Hrs  T(C): 36.8 (29 Jan 2024 13:30), Max: 37.4 (28 Jan 2024 21:30)  T(F): 98.2 (29 Jan 2024 13:30), Max: 99.3 (28 Jan 2024 21:30)  HR: 77 (29 Jan 2024 13:30) (64 - 83)  BP: 137/72 (29 Jan 2024 13:30) (136/87 - 168/81)  BP(mean): --  RR: 18 (29 Jan 2024 13:30) (17 - 18)  SpO2: 100% (29 Jan 2024 13:30) (98% - 100%)    Parameters below as of 29 Jan 2024 13:30  Patient On (Oxygen Delivery Method): room air      CONSTITUTIONAL: NAD   EYES: conjunctiva and sclera clear  ENMT: Moist oral mucosa   NECK: Supple   RESPIRATORY: Normal respiratory effort; lungs are clear to auscultation bilaterally  CARDIOVASCULAR: Regular rate and rhythm, normal S1 and S2, no murmur/rub/gallop; Peripheral pulses are 2+ bilaterally  ABDOMEN: Nontender to palpation, normoactive bowel sounds, no rebound/guarding   MUSCULOSKELETAL: No lower extremity edema   PSYCH: A+O to person, place, and time; affect appropriate  NEUROLOGY: moves all extremities   SKIN: No rashes    LABS:                        11.1   6.70  )-----------( 223      ( 29 Jan 2024 06:37 )             34.4     01-29    141  |  105  |  19  ----------------------------<  139<H>  3.7   |  26  |  0.92    Ca    8.7      29 Jan 2024 06:37  Phos  3.7     01-29  Mg     1.60     01-29            Urinalysis Basic - ( 29 Jan 2024 06:37 )    Color: x / Appearance: x / SG: x / pH: x  Gluc: 139 mg/dL / Ketone: x  / Bili: x / Urobili: x   Blood: x / Protein: x / Nitrite: x   Leuk Esterase: x / RBC: x / WBC x   Sq Epi: x / Non Sq Epi: x / Bacteria: x            RADIOLOGY & ADDITIONAL TESTS:  Other Results Reviewed Today: BMP with stable Cr, CBC with stable Hg     COORDINATION OF CARE:  Communication: discussed plan of care with ACP 
Medicine Progress Note    Patient is a 73y old  Female who presents with a chief complaint of confusion x2 days (26 Jan 2024 14:32)      SUBJECTIVE / OVERNIGHT EVENTS:   pending MRI     ADDITIONAL REVIEW OF SYSTEMS: negative     MEDICATIONS  (STANDING):  allopurinol 100 milliGRAM(s) Oral daily  dextrose 5%. 1000 milliLiter(s) (50 mL/Hr) IV Continuous <Continuous>  dextrose 5%. 1000 milliLiter(s) (100 mL/Hr) IV Continuous <Continuous>  dextrose 50% Injectable 25 Gram(s) IV Push once  dextrose 50% Injectable 12.5 Gram(s) IV Push once  dextrose 50% Injectable 25 Gram(s) IV Push once  enoxaparin Injectable 40 milliGRAM(s) SubCutaneous every 24 hours  folic acid 1 milliGRAM(s) Oral daily  glucagon  Injectable 1 milliGRAM(s) IntraMuscular once  insulin lispro (ADMELOG) corrective regimen sliding scale   SubCutaneous at bedtime  insulin lispro (ADMELOG) corrective regimen sliding scale   SubCutaneous three times a day before meals  multivitamin 1 Tablet(s) Oral daily  pantoprazole    Tablet 40 milliGRAM(s) Oral before breakfast  thiamine IVPB 500 milliGRAM(s) IV Intermittent every 8 hours    MEDICATIONS  (PRN):  acetaminophen     Tablet .. 650 milliGRAM(s) Oral every 6 hours PRN Temp greater or equal to 38C (100.4F), Mild Pain (1 - 3)  dextrose Oral Gel 15 Gram(s) Oral once PRN Blood Glucose LESS THAN 70 milliGRAM(s)/deciliter  LORazepam   Injectable 2 milliGRAM(s) IntraMuscular every 2 hours PRN CIWA-Ar score 8 or greater  melatonin 3 milliGRAM(s) Oral at bedtime PRN Insomnia    CAPILLARY BLOOD GLUCOSE      POCT Blood Glucose.: 121 mg/dL (27 Jan 2024 17:31)  POCT Blood Glucose.: 159 mg/dL (27 Jan 2024 11:28)  POCT Blood Glucose.: 150 mg/dL (27 Jan 2024 06:51)  POCT Blood Glucose.: 159 mg/dL (26 Jan 2024 21:29)    I&O's Summary      PHYSICAL EXAM:  Vital Signs Last 24 Hrs  T(C): 37 (27 Jan 2024 20:30), Max: 37.2 (27 Jan 2024 04:00)  T(F): 98.6 (27 Jan 2024 20:30), Max: 98.9 (27 Jan 2024 04:00)  HR: 81 (27 Jan 2024 20:30) (71 - 92)  BP: 137/81 (27 Jan 2024 20:30) (115/97 - 158/87)  BP(mean): --  RR: 16 (27 Jan 2024 20:30) (16 - 18)  SpO2: 98% (27 Jan 2024 20:30) (95% - 100%)    Parameters below as of 27 Jan 2024 20:30  Patient On (Oxygen Delivery Method): room air      CONSTITUTIONAL: NAD,   ENMT: Moist oral mucosa, no pharyngeal injection or exudates;   RESPIRATORY: Normal respiratory effort; lungs are clear to auscultation bilaterally  CARDIOVASCULAR: Regular rate and rhythm, normal S1 and S2,  No lower extremity edema;  ABDOMEN: Nontender to palpation, normoactive bowel sounds, no rebound/guarding;   PSYCH: A+O to person, place, and time; affect appropriate  NEUROLOGY: CN 2-12 are intact and symmetric; no gross sensory deficits   SKIN: No rashes; no palpable lesions    LABS:                        10.2   7.25  )-----------( 206      ( 27 Jan 2024 05:07 )             32.7     01-27    147<H>  |  109<H>  |  18  ----------------------------<  119<H>  3.6   |  26  |  1.11    Ca    8.3<L>      27 Jan 2024 05:07  Phos  3.0     01-27  Mg     1.70     01-27    TPro  6.4  /  Alb  3.6  /  TBili  0.4  /  DBili  x   /  AST  16  /  ALT  9   /  AlkPhos  61  01-26    PT/INR - ( 26 Jan 2024 02:30 )   PT: 11.4 sec;   INR: 1.02 ratio         PTT - ( 26 Jan 2024 02:30 )  PTT:24.2 sec  CARDIAC MARKERS ( 26 Jan 2024 06:00 )  x     / x     / 74 U/L / x     / 1.2 ng/mL  CARDIAC MARKERS ( 25 Jan 2024 23:25 )  x     / x     / 101 U/L / x     / 1.5 ng/mL      Urinalysis Basic - ( 27 Jan 2024 05:07 )    Color: x / Appearance: x / SG: x / pH: x  Gluc: 119 mg/dL / Ketone: x  / Bili: x / Urobili: x   Blood: x / Protein: x / Nitrite: x   Leuk Esterase: x / RBC: x / WBC x   Sq Epi: x / Non Sq Epi: x / Bacteria: x        Culture - Urine (collected 26 Jan 2024 03:35)  Source: Clean Catch Clean Catch (Midstream)  Final Report (27 Jan 2024 10:18):    >=3 organisms. Probable collection contamination.          RADIOLOGY & ADDITIONAL TESTS:  Imaging from Last 24 Hours:    Electrocardiogram/QTc Interval:    COORDINATION OF CARE:  Care Discussed with Consultants/Other Providers: ACP   
Medicine Progress Note    Patient is a 73y old  Female who presents with a chief complaint of confusion x2 days (26 Jan 2024 06:28)      SUBJECTIVE / OVERNIGHT EVENTS: no events     ADDITIONAL REVIEW OF SYSTEMS:  no events, pending MRI     MEDICATIONS  (STANDING):  allopurinol 100 milliGRAM(s) Oral daily  dextrose 5%. 1000 milliLiter(s) (50 mL/Hr) IV Continuous <Continuous>  dextrose 5%. 1000 milliLiter(s) (100 mL/Hr) IV Continuous <Continuous>  dextrose 50% Injectable 25 Gram(s) IV Push once  dextrose 50% Injectable 25 Gram(s) IV Push once  dextrose 50% Injectable 12.5 Gram(s) IV Push once  enoxaparin Injectable 40 milliGRAM(s) SubCutaneous every 24 hours  folic acid 1 milliGRAM(s) Oral daily  glucagon  Injectable 1 milliGRAM(s) IntraMuscular once  insulin lispro (ADMELOG) corrective regimen sliding scale   SubCutaneous at bedtime  insulin lispro (ADMELOG) corrective regimen sliding scale   SubCutaneous three times a day before meals  multivitamin 1 Tablet(s) Oral daily  pantoprazole    Tablet 40 milliGRAM(s) Oral before breakfast  thiamine IVPB 500 milliGRAM(s) IV Intermittent every 8 hours    MEDICATIONS  (PRN):  acetaminophen     Tablet .. 650 milliGRAM(s) Oral every 6 hours PRN Temp greater or equal to 38C (100.4F), Mild Pain (1 - 3)  dextrose Oral Gel 15 Gram(s) Oral once PRN Blood Glucose LESS THAN 70 milliGRAM(s)/deciliter  LORazepam   Injectable 2 milliGRAM(s) IntraMuscular every 2 hours PRN CIWA-Ar score 8 or greater  melatonin 3 milliGRAM(s) Oral at bedtime PRN Insomnia    CAPILLARY BLOOD GLUCOSE      POCT Blood Glucose.: 202 mg/dL (26 Jan 2024 12:07)  POCT Blood Glucose.: 138 mg/dL (26 Jan 2024 08:58)  POCT Blood Glucose.: 228 mg/dL (25 Jan 2024 19:39)    I&O's Summary      PHYSICAL EXAM:  Vital Signs Last 24 Hrs  T(C): 36.9 (26 Jan 2024 07:36), Max: 37.1 (26 Jan 2024 04:35)  T(F): 98.5 (26 Jan 2024 07:36), Max: 98.7 (26 Jan 2024 04:35)  HR: 100 (26 Jan 2024 13:45) (72 - 112)  BP: 145/89 (26 Jan 2024 13:45) (123/78 - 152/92)  BP(mean): --  RR: 19 (26 Jan 2024 13:45) (16 - 20)  SpO2: 98% (26 Jan 2024 13:45) (95% - 100%)    Parameters below as of 26 Jan 2024 13:45  Patient On (Oxygen Delivery Method): room air      CONSTITUTIONAL: NAD,   ENMT: Moist oral mucosa, no pharyngeal injection or exudates;  RESPIRATORY: Normal respiratory effort; lungs are clear to auscultation bilaterally  CARDIOVASCULAR: Regular rate and rhythm, normal S1 and S2, ; No lower extremity edema;   ABDOMEN: Nontender to palpation, normoactive bowel sounds, no rebound/guarding;  PSYCH: A+O to person, place, and time; affect appropriate  NEUROLOGY: CN 2-12 are intact and symmetric; no gross sensory deficits   SKIN: No rashes; no palpable lesions    LABS:                        10.5   7.72  )-----------( 219      ( 26 Jan 2024 06:00 )             32.6     01-26    147<H>  |  110<H>  |  17  ----------------------------<  140<H>  4.0   |  27  |  1.08    Ca    8.0<L>      26 Jan 2024 06:00  Phos  3.5     01-25  Mg     1.90     01-25    TPro  6.4  /  Alb  3.6  /  TBili  0.4  /  DBili  x   /  AST  16  /  ALT  9   /  AlkPhos  61  01-26    PT/INR - ( 26 Jan 2024 02:30 )   PT: 11.4 sec;   INR: 1.02 ratio         PTT - ( 26 Jan 2024 02:30 )  PTT:24.2 sec  CARDIAC MARKERS ( 26 Jan 2024 06:00 )  x     / x     / 74 U/L / x     / 1.2 ng/mL  CARDIAC MARKERS ( 25 Jan 2024 23:25 )  x     / x     / 101 U/L / x     / 1.5 ng/mL      Urinalysis Basic - ( 26 Jan 2024 06:00 )    Color: x / Appearance: x / SG: x / pH: x  Gluc: 140 mg/dL / Ketone: x  / Bili: x / Urobili: x   Blood: x / Protein: x / Nitrite: x   Leuk Esterase: x / RBC: x / WBC x   Sq Epi: x / Non Sq Epi: x / Bacteria: x            RADIOLOGY & ADDITIONAL TESTS:  Imaging from Last 24 Hours:    Electrocardiogram/QTc Interval:    COORDINATION OF CARE:  Care Discussed with Consultants/Other Providers: ACP   
Medicine Progress Note    Patient is a 73y old  Female who presents with a chief complaint of confusion x2 days (27 Jan 2024 21:12)      SUBJECTIVE / OVERNIGHT EVENTS: no events over night     ADDITIONAL REVIEW OF SYSTEMS: negative , pending MRI     MEDICATIONS  (STANDING):  allopurinol 100 milliGRAM(s) Oral daily  dextrose 5%. 1000 milliLiter(s) (50 mL/Hr) IV Continuous <Continuous>  dextrose 5%. 1000 milliLiter(s) (100 mL/Hr) IV Continuous <Continuous>  dextrose 50% Injectable 25 Gram(s) IV Push once  dextrose 50% Injectable 12.5 Gram(s) IV Push once  dextrose 50% Injectable 25 Gram(s) IV Push once  enoxaparin Injectable 40 milliGRAM(s) SubCutaneous every 24 hours  folic acid 1 milliGRAM(s) Oral daily  glucagon  Injectable 1 milliGRAM(s) IntraMuscular once  insulin lispro (ADMELOG) corrective regimen sliding scale   SubCutaneous three times a day before meals  insulin lispro (ADMELOG) corrective regimen sliding scale   SubCutaneous at bedtime  multivitamin 1 Tablet(s) Oral daily  pantoprazole    Tablet 40 milliGRAM(s) Oral before breakfast  thiamine IVPB 500 milliGRAM(s) IV Intermittent every 8 hours    MEDICATIONS  (PRN):  acetaminophen     Tablet .. 650 milliGRAM(s) Oral every 6 hours PRN Temp greater or equal to 38C (100.4F), Mild Pain (1 - 3)  dextrose Oral Gel 15 Gram(s) Oral once PRN Blood Glucose LESS THAN 70 milliGRAM(s)/deciliter  LORazepam   Injectable 2 milliGRAM(s) IntraMuscular every 2 hours PRN CIWA-Ar score 8 or greater  melatonin 3 milliGRAM(s) Oral at bedtime PRN Insomnia    CAPILLARY BLOOD GLUCOSE      POCT Blood Glucose.: 154 mg/dL (28 Jan 2024 07:24)  POCT Blood Glucose.: 139 mg/dL (27 Jan 2024 22:08)  POCT Blood Glucose.: 121 mg/dL (27 Jan 2024 17:31)    I&O's Summary      PHYSICAL EXAM:  Vital Signs Last 24 Hrs  T(C): 36.5 (28 Jan 2024 11:26), Max: 37 (27 Jan 2024 20:30)  T(F): 97.7 (28 Jan 2024 11:26), Max: 98.6 (27 Jan 2024 20:30)  HR: 66 (28 Jan 2024 11:26) (66 - 83)  BP: 139/76 (28 Jan 2024 11:26) (130/75 - 157/89)  BP(mean): --  RR: 18 (28 Jan 2024 11:26) (16 - 18)  SpO2: 99% (28 Jan 2024 11:26) (98% - 100%)    Parameters below as of 28 Jan 2024 11:26  Patient On (Oxygen Delivery Method): room air      CONSTITUTIONAL: NAD,  ENMT: Moist oral mucosa, no pharyngeal injection or exudates;  RESPIRATORY: Normal respiratory effort; lungs are clear to auscultation bilaterally  CARDIOVASCULAR: Regular rate and rhythm, normal S1 and S2,; No lower extremity edema;   ABDOMEN: Nontender to palpation, normoactive bowel sounds, no rebound/guarding;   PSYCH: A+O to person, place, and time; affect appropriate  NEUROLOGY: CN 2-12 are intact and symmetric; no gross sensory deficits   SKIN: No rashes; no palpable lesions    LABS:                        10.2   6.53  )-----------( 219      ( 28 Jan 2024 06:30 )             33.0     01-28    144  |  107  |  22  ----------------------------<  143<H>  3.9   |  28  |  1.15    Ca    8.6      28 Jan 2024 06:30  Phos  3.7     01-28  Mg     1.70     01-28            Urinalysis Basic - ( 28 Jan 2024 06:30 )    Color: x / Appearance: x / SG: x / pH: x  Gluc: 143 mg/dL / Ketone: x  / Bili: x / Urobili: x   Blood: x / Protein: x / Nitrite: x   Leuk Esterase: x / RBC: x / WBC x   Sq Epi: x / Non Sq Epi: x / Bacteria: x        Culture - Urine (collected 26 Jan 2024 03:35)  Source: Clean Catch Clean Catch (Midstream)  Final Report (27 Jan 2024 10:18):    >=3 organisms. Probable collection contamination.          RADIOLOGY & ADDITIONAL TESTS:  Imaging from Last 24 Hours:    Electrocardiogram/QTc Interval:    COORDINATION OF CARE:  Care Discussed with Consultants/Other Providers: BRITTA

## 2024-01-31 NOTE — PROGRESS NOTE ADULT - REASON FOR ADMISSION
confusion
confusion x2 days

## 2024-01-31 NOTE — PROGRESS NOTE ADULT - PROBLEM SELECTOR PROBLEM 8
Detail Level: Detailed
Need for prophylactic measure
Size Of Lesion In Cm (Optional): 0
Need for prophylactic measure

## 2024-01-31 NOTE — CONSULT NOTE ADULT - SUBJECTIVE AND OBJECTIVE BOX
CHIEF COMPLAINT:  Called to evaluate patient with CVA for possible ILR placement    HISTORY OF PRESENT ILLNESS:  73F with PMHx HTN, gout, ETOH abuse, DM2, GERD brought in by sister for confusion. Patient reports that she doesn't remember why she came to the hospital. She states that her sister brought her. As per medical records, patient has been having odd behaviors at home since 1/24.  She wakes up in the middle of the night more frequently. She has history of confusion in the past with her drinking but this was more than usual. When she drinks she usually has ataxic gait. However she was unsteady on her feet on taty day of her admission and was told by PMD  to come to ED. Patient has been endorsing headache over the few days prior to confusion episodes. CT head demonstrated possible straight sinus thrombosis. Patient reports that she feels better. Reported having some headache during hospital course but it resolved and states she feels back to her baseline. MRI brain and MR venogram performed showed acute infarcts in multiple territories. EP was called for ILR placement. EKG and telemetry shows sinus rhythm with HR 80s-110s. Patient denies any chest pain, SOB, palpitations or dizziness. Echocardiogram shows left ventricular systolic function is normal.        PAST MEDICAL & SURGICAL HISTORY:  HTN (hypertension)  Type 2 diabetes mellitus with hyperglycemia, without long-term current use of insulin      No significant past surgical history      PREVIOUS DIAGNOSTIC TESTING:     Echocardiogram:   from: PADMINI W or WO Ultrasound Enhancing Agent (01.31.24 @ 13:12)   CONCLUSIONS:      1. Left ventricular cavity is normal. Left ventricular systolic function is normal.   2. Normal right ventricular cavity size and probably normal systolic function.   3. The left atrium is enlarged.   4. No thrombus seen in the left atrial, left atrial appendage, right atrial or right atrial appendage.   5. Left atrial appendage emptying velocites are normal.   6. No significant valvular disease.   7. Compared to the transthoracic echocardiogram performed on 1/30/2024, there have been no significant interval changes.   8. Agitated saline injection was negative for intracardiac shunt.    ____________________________________________________________________  PADMINI Procedure:  After discussion of the risks and benefits of the PADMINI, an informed consent was obtained. Study was performed under anesthesia. The PADMINI probe was passed without difficulty. Images were obtained with the patient in a reclined position. Image quality was adequate. The patient's vital signs; including heart rate, blood pressure, and oxygen saturation; remained stable throughout the procedure. The patient tolerated the procedure well and without complications.  ________________________________________________________________________________________  FINDINGS:     Left Ventricle:  The left ventricular cavity is normal. Left ventricular systolic function is normal.     Right Ventricle:  The right ventricular cavity is normal in size and probably normal systolic function.     Left Atrium:  The left atrium is enlarged. There is no evidence of left atrial or leftatrial appendage thrombus. Emptying velocities of the left atrial appendage are normal. The pulmonary venous flow pattern is normal.     Right Atrium:  The right atrium was not well visualized. There is no evidence of right atrial or right atrial appendage thrombus.     Interatrial Septum:  Agitated saline injection was negative for intracardiac shunt.     Aortic Valve:  The aortic valve appears trileaflet with normal systolic excursion. There is fibrocalcific aortic valve sclerosis without stenosis.     Mitral Valve:  Structurally normal mitral valve with normal leaflet excursion. The mitral valve was not well visualized.     Tricuspid Valve:  Structurally normal tricuspid valve with normal leaflet excursion. The tricuspid valve was not wellvisualized. There is mild to moderate tricuspid regurgitation.     Pulmonic Valve:  Structurally normal pulmonic valve with normal leaflet excursion.     Aorta:  There is no atheroma in the visualized portions of the proximal ascending aorta. There is no atheroma in the visualized portions of the transverse aortic arch. There is no atheroma in the visualized portions of the descending aorta.     Pericardium:  No pericardial effusion seen.        MEDICATIONS:  aspirin  chewable 81 milliGRAM(s) Oral daily  clopidogrel Tablet 75 milliGRAM(s) Oral daily  enoxaparin Injectable 40 milliGRAM(s) SubCutaneous every 24 hours  acetaminophen     Tablet .. 650 milliGRAM(s) Oral every 6 hours PRN  melatonin 3 milliGRAM(s) Oral at bedtime PRN  pantoprazole    Tablet 40 milliGRAM(s) Oral before breakfast  allopurinol 100 milliGRAM(s) Oral daily  dextrose 50% Injectable 25 Gram(s) IV Push once  dextrose 50% Injectable 12.5 Gram(s) IV Push once  dextrose 50% Injectable 25 Gram(s) IV Push once  dextrose Oral Gel 15 Gram(s) Oral once PRN  glucagon  Injectable 1 milliGRAM(s) IntraMuscular once  insulin lispro (ADMELOG) corrective regimen sliding scale   SubCutaneous three times a day before meals  insulin lispro (ADMELOG) corrective regimen sliding scale   SubCutaneous at bedtime    dextrose 5%. 1000 milliLiter(s) IV Continuous <Continuous>  dextrose 5%. 1000 milliLiter(s) IV Continuous <Continuous>  folic acid 1 milliGRAM(s) Oral daily  multivitamin 1 Tablet(s) Oral daily  thiamine 100 milliGRAM(s) Oral daily      FAMILY HISTORY:  No pertinent family history in first degree relatives        SOCIAL HISTORY:    [-]Smoking:   [+]Alcohol:  [-]Drugs:    Allergies    No Known Allergies    Intolerances    	    REVIEW OF SYSTEMS:  CONSTITUTIONAL: No fever, weight loss, or fatigue  EYES: No eye pain, visual disturbances, or discharge  ENMT:  No difficulty hearing, tinnitus, vertigo; No sinus or throat pain  NECK: No pain or stiffness  RESPIRATORY: No cough, wheezing, chills or hemoptysis; No Shortness of Breath  CARDIOVASCULAR: No chest pain, palpitations, passing out, dizziness, or leg swelling  GASTROINTESTINAL: No abdominal or epigastric pain. No nausea, vomiting, or hematemesis; No diarrhea or constipation. No melena or hematochezia.  GENITOURINARY: No dysuria, frequency, hematuria, or incontinence  NEUROLOGICAL: No headaches, memory loss, loss of strength, numbness, or tremors  SKIN: No itching, burning, rashes, or lesions   LYMPH Nodes: No enlarged glands  ENDOCRINE: No heat or cold intolerance; No hair loss  MUSCULOSKELETAL: No joint pain or swelling; No muscle, back, or extremity pain  PSYCHIATRIC: No depression, anxiety, mood swings, or difficulty sleeping  HEME/LYMPH: No easy bruising, or bleeding gums  ALLERY AND IMMUNOLOGIC: No hives or eczema	    [X] All others negative	  [ ] Unable to obtain    PHYSICAL EXAM:  T(C): 36.9 (01-31-24 @ 12:00), Max: 36.9 (01-31-24 @ 12:00)  HR: 71 (01-31-24 @ 12:00) (69 - 94)  BP: 126/81 (01-31-24 @ 12:00) (126/81 - 139/81)  RR: 17 (01-31-24 @ 12:00) (16 - 17)  SpO2: 97% (01-31-24 @ 12:00) (96% - 100%)  Wt(kg): --  I&O's Summary    30 Jan 2024 07:01  -  31 Jan 2024 07:00  --------------------------------------------------------  IN: 620 mL / OUT: 0 mL / NET: 620 mL    31 Jan 2024 07:01  -  31 Jan 2024 17:05  --------------------------------------------------------  IN: 0 mL / OUT: 1 mL / NET: -1 mL        Appearance: Normal	  Cardiovascular: Normal S1 S2, No JVD, No murmurs, No edema  Respiratory: Lungs clear to auscultation	  Psychiatry: A & O x 3, Mood & affect appropriate  Gastrointestinal:  Soft, Non-tender, + BS	  Extremities: Normal range of motion, No clubbing, cyanosis or edema      TELEMETRY: 	Sinus rhythm with HR 80s-110s    ECG:  	Sinus hrythm with  bpm; Qrs 74 ms; Qtc 445 ms  RADIOLOGY:  OTHER: 	  	  LABS:	 	    CARDIAC MARKERS:                        11.0   6.37  )-----------( 225      ( 31 Jan 2024 05:30 )             34.2     01-31    142  |  106  |  18  ----------------------------<  141<H>  5.0   |  23  |  1.01    Ca    8.9      31 Jan 2024 05:30  Phos  4.3     01-31  Mg     1.80     01-31      TSH: 1.13 uIU/mL    ASSESSMENT/PLAN: 73F with PMHx HTN, gout, ETOH abuse, DM2, GERD brought in by sister for confusion. CT head demonstrated possible straight sinus thrombosis. MRI brain and MR venogram performed showed acute infarcts in multiple territories. EP was called for ILR placement. There is no telemetry evidence of atrial arrhythmias; nor is there a clear pacing indication at this time.  Per CRYSTAL-AF, patient will benefit from prolonged monitoring for detection of AF/PAF as cause of potential cardioembolic source.  ILR implantation for prolonged monitoring for detection of suspected, asymptomatic AF/PAF advised as 2D TTE is without SABRINA clot. Risks, benefits, and alternatives discussed with patient and she wants to discuss with her sister.  - Continue telemetry  - Plan for ILR placement if patient/family agreeable

## 2024-02-01 ENCOUNTER — TRANSCRIPTION ENCOUNTER (OUTPATIENT)
Age: 74
End: 2024-02-01

## 2024-02-01 VITALS
DIASTOLIC BLOOD PRESSURE: 85 MMHG | SYSTOLIC BLOOD PRESSURE: 144 MMHG | TEMPERATURE: 98 F | HEART RATE: 85 BPM | OXYGEN SATURATION: 99 % | RESPIRATION RATE: 17 BRPM

## 2024-02-01 PROBLEM — E11.65 TYPE 2 DIABETES MELLITUS WITH HYPERGLYCEMIA: Chronic | Status: ACTIVE | Noted: 2024-01-26

## 2024-02-01 LAB
ANION GAP SERPL CALC-SCNC: 12 MMOL/L — SIGNIFICANT CHANGE UP (ref 7–14)
BASOPHILS # BLD AUTO: 0.06 K/UL — SIGNIFICANT CHANGE UP (ref 0–0.2)
BASOPHILS NFR BLD AUTO: 0.8 % — SIGNIFICANT CHANGE UP (ref 0–2)
BUN SERPL-MCNC: 15 MG/DL — SIGNIFICANT CHANGE UP (ref 7–23)
CALCIUM SERPL-MCNC: 9 MG/DL — SIGNIFICANT CHANGE UP (ref 8.4–10.5)
CHLORIDE SERPL-SCNC: 107 MMOL/L — SIGNIFICANT CHANGE UP (ref 98–107)
CO2 SERPL-SCNC: 26 MMOL/L — SIGNIFICANT CHANGE UP (ref 22–31)
CREAT SERPL-MCNC: 0.97 MG/DL — SIGNIFICANT CHANGE UP (ref 0.5–1.3)
EGFR: 62 ML/MIN/1.73M2 — SIGNIFICANT CHANGE UP
EOSINOPHIL # BLD AUTO: 0.13 K/UL — SIGNIFICANT CHANGE UP (ref 0–0.5)
EOSINOPHIL NFR BLD AUTO: 1.8 % — SIGNIFICANT CHANGE UP (ref 0–6)
GLUCOSE BLDC GLUCOMTR-MCNC: 143 MG/DL — HIGH (ref 70–99)
GLUCOSE BLDC GLUCOMTR-MCNC: 162 MG/DL — HIGH (ref 70–99)
GLUCOSE BLDC GLUCOMTR-MCNC: 191 MG/DL — HIGH (ref 70–99)
GLUCOSE BLDC GLUCOMTR-MCNC: 214 MG/DL — HIGH (ref 70–99)
GLUCOSE SERPL-MCNC: 135 MG/DL — HIGH (ref 70–99)
HCT VFR BLD CALC: 33.4 % — LOW (ref 34.5–45)
HGB BLD-MCNC: 10.9 G/DL — LOW (ref 11.5–15.5)
IANC: 4.79 K/UL — SIGNIFICANT CHANGE UP (ref 1.8–7.4)
IMM GRANULOCYTES NFR BLD AUTO: 0.4 % — SIGNIFICANT CHANGE UP (ref 0–0.9)
LYMPHOCYTES # BLD AUTO: 1.53 K/UL — SIGNIFICANT CHANGE UP (ref 1–3.3)
LYMPHOCYTES # BLD AUTO: 21 % — SIGNIFICANT CHANGE UP (ref 13–44)
MAGNESIUM SERPL-MCNC: 1.8 MG/DL — SIGNIFICANT CHANGE UP (ref 1.6–2.6)
MCHC RBC-ENTMCNC: 27.7 PG — SIGNIFICANT CHANGE UP (ref 27–34)
MCHC RBC-ENTMCNC: 32.6 GM/DL — SIGNIFICANT CHANGE UP (ref 32–36)
MCV RBC AUTO: 85 FL — SIGNIFICANT CHANGE UP (ref 80–100)
MONOCYTES # BLD AUTO: 0.75 K/UL — SIGNIFICANT CHANGE UP (ref 0–0.9)
MONOCYTES NFR BLD AUTO: 10.3 % — SIGNIFICANT CHANGE UP (ref 2–14)
NEUTROPHILS # BLD AUTO: 4.79 K/UL — SIGNIFICANT CHANGE UP (ref 1.8–7.4)
NEUTROPHILS NFR BLD AUTO: 65.7 % — SIGNIFICANT CHANGE UP (ref 43–77)
NRBC # BLD: 0 /100 WBCS — SIGNIFICANT CHANGE UP (ref 0–0)
NRBC # FLD: 0 K/UL — SIGNIFICANT CHANGE UP (ref 0–0)
PHOSPHATE SERPL-MCNC: 4.2 MG/DL — SIGNIFICANT CHANGE UP (ref 2.5–4.5)
PLATELET # BLD AUTO: 248 K/UL — SIGNIFICANT CHANGE UP (ref 150–400)
POTASSIUM SERPL-MCNC: 3.9 MMOL/L — SIGNIFICANT CHANGE UP (ref 3.5–5.3)
POTASSIUM SERPL-SCNC: 3.9 MMOL/L — SIGNIFICANT CHANGE UP (ref 3.5–5.3)
RBC # BLD: 3.93 M/UL — SIGNIFICANT CHANGE UP (ref 3.8–5.2)
RBC # FLD: 16.2 % — HIGH (ref 10.3–14.5)
SODIUM SERPL-SCNC: 145 MMOL/L — SIGNIFICANT CHANGE UP (ref 135–145)
WBC # BLD: 7.29 K/UL — SIGNIFICANT CHANGE UP (ref 3.8–10.5)
WBC # FLD AUTO: 7.29 K/UL — SIGNIFICANT CHANGE UP (ref 3.8–10.5)

## 2024-02-01 PROCEDURE — 33285 INSJ SUBQ CAR RHYTHM MNTR: CPT

## 2024-02-01 PROCEDURE — 99239 HOSP IP/OBS DSCHRG MGMT >30: CPT

## 2024-02-01 RX ORDER — METOPROLOL TARTRATE 50 MG
1 TABLET ORAL
Refills: 0 | DISCHARGE

## 2024-02-01 RX ORDER — FOLIC ACID 0.8 MG
1 TABLET ORAL
Qty: 0 | Refills: 0 | DISCHARGE
Start: 2024-02-01

## 2024-02-01 RX ORDER — CLOPIDOGREL BISULFATE 75 MG/1
1 TABLET, FILM COATED ORAL
Qty: 21 | Refills: 0
Start: 2024-02-01 | End: 2024-02-21

## 2024-02-01 RX ORDER — THIAMINE MONONITRATE (VIT B1) 100 MG
1 TABLET ORAL
Qty: 0 | Refills: 0 | DISCHARGE
Start: 2024-02-01

## 2024-02-01 RX ORDER — ASPIRIN/CALCIUM CARB/MAGNESIUM 324 MG
1 TABLET ORAL
Qty: 0 | Refills: 0 | DISCHARGE
Start: 2024-02-01

## 2024-02-01 RX ORDER — COLCHICINE 0.6 MG
1 TABLET ORAL
Refills: 0 | DISCHARGE

## 2024-02-01 RX ORDER — ATORVASTATIN CALCIUM 80 MG/1
1 TABLET, FILM COATED ORAL
Qty: 30 | Refills: 0
Start: 2024-02-01 | End: 2024-03-01

## 2024-02-01 RX ADMIN — Medication 1 TABLET(S): at 12:12

## 2024-02-01 RX ADMIN — Medication 100 MILLIGRAM(S): at 13:56

## 2024-02-01 RX ADMIN — CLOPIDOGREL BISULFATE 75 MILLIGRAM(S): 75 TABLET, FILM COATED ORAL at 12:12

## 2024-02-01 RX ADMIN — Medication 1 MILLIGRAM(S): at 12:12

## 2024-02-01 RX ADMIN — Medication 1: at 13:56

## 2024-02-01 RX ADMIN — ENOXAPARIN SODIUM 40 MILLIGRAM(S): 100 INJECTION SUBCUTANEOUS at 09:24

## 2024-02-01 RX ADMIN — PANTOPRAZOLE SODIUM 40 MILLIGRAM(S): 20 TABLET, DELAYED RELEASE ORAL at 06:02

## 2024-02-01 RX ADMIN — Medication 81 MILLIGRAM(S): at 12:12

## 2024-02-01 RX ADMIN — Medication 1: at 17:13

## 2024-02-01 NOTE — DISCHARGE NOTE NURSING/CASE MANAGEMENT/SOCIAL WORK - NSDCPEFALRISK_GEN_ALL_CORE
For information on Fall & Injury Prevention, visit: [email or web address] OR  [email or web address] OR  https://www.cdc.gov/steadi/patient.html

## 2024-02-01 NOTE — CHART NOTE - NSCHARTNOTEFT_GEN_A_CORE
Procedure: Transesophageal Echocardiogram (PADMINI)    : Dr. Martin Grover, Dr. Gerson Reed  Fellow: Dr. Ron Chatman    Indication:  [x] evaluation of intracardiac thrombus (e.g. Stroke, pre-DCCV, CIED/LAAO implantation, or ablation)  [ ] evaluation of valvulopathy  [ ] evaluation of infective endocarditis/cardiac mass  [ ] evaluation of cardiac structure and function in setting of inadequate TTE  [ ] others:    Pre-op evaluations:  Respiratory: no active exacerbation of Asthma, COPD, ZAIRA, or recent respiratory illness.  GI: No significant dysphagia and odynophagia. No recent Esophageal surgeries, strictures, diverticula, masses, varices, diaphragmatic hernia, stomach ulcers, stomach surgeries, bariatric surgery, GI bleed.  [ ] GI consulted: N/A  MSK: No Atlantoaxial disease and severe generalized cervical arthritis. No history of significant autoimmune disease related to atlantoaxial instability.  Oral/Dental: no loose, broken tooth/teeth, mouth sores.     NPO except medications  Vitals, Labs, and Medications reviewed  ASA/ Mallampatti assessed    Consent: Obtained and located in physical chart    PADMINI Probe used:  [x] x8-2t Transesophageal Transducer   [ ] Pediatric Ultrasound Transducer    Estimated blood loss:   [x] none   [ ] minimal (< 10 cc)    Complications: None    Anesthesia type: Conscious sedation w/ or w/o topical anesthesia    Procedural success:  [x] successfully completed without complication  [ ] procedure aborted due to:  [ ] procedure cancelled due to:    Findings:   No intracardiac source of embolism    This is a preliminary report by Fellow. Full PADMINI report to follow.    Ron Chatman MD  PGY-7 Cardiology Fellow
73F with PMHx HTN, gout, ETOH abuse, DM2, GERD brought in by sister for confusion. CT head demonstrated possible straight sinus thrombosis. MRI brain and MR venogram performed showed acute infarcts in multiple territories. EP was called for ILR placement. There is no telemetry evidence of atrial arrhythmias; nor is there a clear pacing indication at this time.  Per CRYSTAL-AF, patient will benefit from prolonged monitoring for detection of AF/PAF as cause of potential cardioembolic source.  ILR implantation for prolonged monitoring for detection of suspected, asymptomatic AF/PAF advised as 2D TTE is without SABRINA clot. Risks, benefits, and alternatives discussed with patient, her sister and niece. Patient agreeable for ILR placement and consented for the procedure  - Continue telemetry  - Plan for ILR placement today
s/p ILR implantation  ILR  was covered with a  clear with a gauge and clear Tegaderm. It was clean, dry, and intact. No bleeding, drainage hematoma, or ecchymosis appreciated.      Post-op ILR instruction has been verbally explained and given to the patient. Patient was given ID card, Booklet and home monitor. Patient expressed understanding and all questions were answered. A carbon copy is located in the patient's chart  Patient is scheduled for a telephone appointment on 2/16/24 @ 2:20pm  You can return to normal activities 24hours after the procedure   No scrubbing the incision site  No lotion, ointment, powder or direct sunlight to the incision site for 2 weeks   No swimming pool, Jacuzzi, or bath for 7 days.   Patient can shower 24 hours after procedure. Pat the area dry  Pt was instructed to call [phone] if the following occurs:      - fever with temperature > 101F      - swelling, drainage or bleeding at the site incision

## 2024-02-01 NOTE — DISCHARGE NOTE PROVIDER - NSDCMRMEDTOKEN_GEN_ALL_CORE_FT
allopurinol 100 mg oral tablet: 1 tab(s) orally once a day  colchicine 0.6 mg oral tablet: 1 tab(s) orally once a day  metFORMIN 1000 mg oral tablet: 1 tab(s) orally 2 times a day  metoprolol tartrate 50 mg oral tablet: 1 tab(s) orally 2 times a day  pantoprazole 40 mg oral delayed release tablet: 1 tab(s) orally once a day   allopurinol 100 mg oral tablet: 1 tab(s) orally once a day  aspirin 81 mg oral tablet, chewable: 1 tab(s) orally once a day  clopidogrel 75 mg oral tablet: 1 tab(s) orally once a day Please continue to take for a total of 3 weeks, last dose 2/21  folic acid 1 mg oral tablet: 1 tab(s) orally once a day  Lipitor 40 mg oral tablet: 1 tab(s) orally once a day (at bedtime)  metFORMIN 1000 mg oral tablet: 1 tab(s) orally 2 times a day  Multiple Vitamins oral tablet: 1 tab(s) orally once a day  pantoprazole 40 mg oral delayed release tablet: 1 tab(s) orally once a day  thiamine 100 mg oral tablet: 1 tab(s) orally once a day

## 2024-02-01 NOTE — DISCHARGE NOTE PROVIDER - NSDCFUADDAPPT_GEN_ALL_CORE_FT
Please call Neurology for a follow-up appointment   [address]   [place] [address] [phone]

## 2024-02-01 NOTE — DISCHARGE NOTE PROVIDER - NSDCCPCAREPLAN_GEN_ALL_CORE_FT
PRINCIPAL DISCHARGE DIAGNOSIS  Diagnosis: Acute cerebrovascular accident (CVA)  Assessment and Plan of Treatment: - you presented for complaints of nnot being yourself and walking problems   - we tested you extensively   - we treated you for wernicke's encephalopathy with IV thiamine. Please continue to take thiamine at home   - we also did an MRI that found a stroke   - we did an extensive workup  - your CT scans did not show any cancer   - We checked a TTE and PADMINI heart tests which did not show any major heart problems   - we also placed a loop recorder in your chest. Please follow up with the EP doctors   - please start aspirin 81mg daily forever   - please finish a 3 week course of plavix 75mg daily   - please see the brain doctors in clinic (neurologists)   - you still need to get a MR angio head/neck, your neurologist will order it      SECONDARY DISCHARGE DIAGNOSES  Diagnosis: Alcohol abuse  Assessment and Plan of Treatment: - we monitored you closely for withdrawl   - please stop drinking alcohol    Diagnosis: Gout  Assessment and Plan of Treatment: continue allopurinol 100 mg daily    Diagnosis: Type 2 diabetes mellitus with hyperglycemia, without long-term current use of insulin  Assessment and Plan of Treatment: -continue your metformin   - A1c 6.4  - please see your PCP ASAP for further care

## 2024-02-01 NOTE — PHARMACOTHERAPY INTERVENTION NOTE - COMMENTS
Discharge medications reviewed with patient's niece (who is a RN), over the phone. Outpatient medication schedule was discussed in detail including: medication name, indication, dose, administration times, treatment duration, side effects and special instructions. Patient questions and concerns were answered and addressed. Patient demonstrated understanding. Informed patient that medication list may change prior to discharge. Patient provided with educational drug cards.    Marisa Hampton, PharmD, Clinical Pharmacy Specialist, [id number]

## 2024-02-01 NOTE — DISCHARGE NOTE PROVIDER - NSDCFUSCHEDAPPT_GEN_ALL_CORE_FT
[facility] Physician [facility] [phone]  Scheduled Appointment: 02/16/2024

## 2024-02-01 NOTE — DISCHARGE NOTE PROVIDER - HOSPITAL COURSE
73F with PMHx HTN, gout, ETOH abuse, DM2, GERD presenting with confusion brought in by sister. Admitted for acute encephalopathy and concern for Wernicke's encephalopathy       Problem/Plan - 1:  ·  Problem: Acute encephalopathy.   ·  Plan: - Odd behavior at home. Here without complaints at this time and oriented x3. Hx of ataxia as well and ETOH abuse. - c/f wernicke's encephalopathy vs CVA    - s/p IV thiamine 500mg q8 for 3+ days, now on thiamine 100 mg daily   - continue folate, MV  - CTH: Hyperdense straight sinus and vein of Goyo suspicious for central venous thrombosis. Currently asymptomatic and no focal deficits.   - MRI now shows multiple small foci of acute infarction scattered within multiple vascular distributions of the cerebral hemispheric white matter. No evidence of dural sinus thrombosis. Plan as below   -UA (neg), ETOH level (neg), TSH WNL, ammonia (nl),  folate/ b12 WNL   -no photophobia, no fever, no meningeal signs. Do not suspect meningitis.     Problem/Plan - 2:  ·  Problem: Acute cerebrovascular accident (CVA).   ·  Plan: - MRI now shows multiple small foci of acute infarction scattered within multiple vascular distributions of the cerebral hemispheric white matter. No evidence of dural sinus thrombosis.  - neurology consulted, recs appreciated   - start aspirin 81mg daily and 3 weeks of plavix 75mg daily (with one time plavix load of 300mg). After 3 weeks, patient would continue aspirin 81mg daily monotherapy   - LDL 71, A1c 6.4   - carotid US completed: no sig stenosis   - PADMINI ordered: No intracardiac source of embolism  - CT chest, abdomen, pelvis w/w/o con without source of malignancy   - ordered MR angio head/neck with contrast, but can be done as OP   - EP consulted for ILR  - continue tele.     Problem/Plan - 3:  ·  Problem: Alcohol abuse.   ·  Plan: - Drinks one bottle of saniya per day, last drink 1/24 4PM. Denies withdrawal sx at this time. Comfortable appearing  - MV, FA, thiamine as above  - CIWA with sx triggered ativan.     Problem/Plan - 4:  ·  Problem: HTN (hypertension).   ·  Plan: - not on any meds at home   - normotensive.     Problem/Plan - 5:  ·  Problem: Gout.   ·  Plan: - per med rec pharm, no recent fills of allopurinol 100mg qd or colchicine  - can continue low dose prevention with allopurinol 100mg.     Problem/Plan - 6:  ·  Problem: Type 2 diabetes mellitus with hyperglycemia, without long-term current use of insulin.   ·  Plan: On metformin - hold inpatient. Not on insulin at home  -A1C, ISS.     Problem/Plan - 7:  ·  Problem: Abnormal EKG.   ·  Plan: sinus tachycardia @ 107bpm, biatrial enlargement, downsloping/STD? ST segment in lateral leads, II, AVF  -no sig events on tele  -echo was technically difficult  - PADMINI without significant findings   -no CP, SOB or other cardiac complaints  - ctm.     Problem/Plan - 8:  ·  Problem: Need for prophylactic measure.   ·  Plan: Lovenox 40mg qd  DASH/TLC CC diet    Dispo once ILR is placed.   73F with PMHx HTN, gout, ETOH abuse, DM2, GERD presenting with confusion brought in by sister. Admitted for acute encephalopathy and concern for Wernicke's encephalopathy. Found to have an acute CVA.     #Acute encephalopathy  - Odd behavior at home. Here without complaints at this time and oriented x3. Hx of ataxia as well and ETOH abuse. - c/f wernicke's encephalopathy and CVA. No photophobia, no fever, no meningeal signs.   - UA (neg), ETOH level (neg), TSH WNL, ammonia (nl),  folate/ b12 WNL   - s/p IV thiamine 500mg q8 for 3 days, now on thiamine 100 mg daily. Continue folate, MV  - CTH: Hyperdense straight sinus and vein of Goyo suspicious for central venous thrombosis. Currently asymptomatic and no focal deficits.   - MRI showed multiple small foci of acute infarction scattered within multiple vascular distributions of the cerebral hemispheric white matter. No evidence of dural sinus thrombosis.  - no photophobia, no fever, no meningeal signs. Do not suspect meningitis.    #Acute cerebrovascular accident (CVA).   - MRI now shows multiple small foci of acute infarction scattered within multiple vascular distributions of the cerebral hemispheric white matter. No evidence of dural sinus thrombosis.  - neurology consulted, recs appreciated   - start aspirin 81mg daily and 3 weeks of plavix 75mg daily (with one time plavix load of 300mg). After 3 weeks, patient would continue aspirin 81mg daily monotherapy   - LDL 71, A1c 6.4   - carotid US completed: no sig stenosis   - PADMINI ordered: No intracardiac source of embolism  - CT chest, abdomen, pelvis w/w/o con without source of malignancy   -MR angio head/neck with contrast to be done as OP   - ILR placed by EP, will need OP follow up with EP     #Alcohol abuse.   - Drinks one bottle of saniya per day, last drink 1/24 4PM. Denies withdrawal sx at this time. Comfortable appearing  - MV, FA, thiamine as above  - CIWAs negative. Did not require PRNS    #Gout.   - per med rec pharm, no recent fills of allopurinol 100mg qd or colchicine  - can continue low dose prevention with allopurinol 100mg.    # Type 2 diabetes mellitus with hyperglycemia, without long-term current use of insulin.   - On metformin. Restart as OP   - A1c 6.4     Medically Cleared for DC

## 2024-02-01 NOTE — DISCHARGE NOTE PROVIDER - NSDCFUADDINST_GEN_ALL_CORE_FT
POST LOOP RECORDER INSTRUCTIONS  You can return to normal activities 24hours after the procedure   No scrubbing the incision site  No lotion, ointment, powder or direct sunlight to the incision site for 2 weeks   No swimming pool, Jacuzzi, or bath for 7 days.   Patient can shower 24 hours after procedure. Pat the area dry  Pt was instructed to call [phone] if the following occurs:      - fever with temperature > 101F      - swelling, drainage or bleeding at the site incision.

## 2024-02-01 NOTE — DISCHARGE NOTE NURSING/CASE MANAGEMENT/SOCIAL WORK - NSDCFUADDAPPT_GEN_ALL_CORE_FT
Please call Neurology for a follow-up appointment   [address]   [place] [address] [phone]

## 2024-02-01 NOTE — DISCHARGE NOTE NURSING/CASE MANAGEMENT/SOCIAL WORK - PATIENT PORTAL LINK FT
You can access the FollowMyHealth Patient Portal offered by [facility] by registering at the following website: [email or web address]. By joining [employer]’s FollowMyHealth portal, you will also be able to view your health information using other applications (apps) compatible with our system.

## 2024-02-02 PROBLEM — Z00.00 ENCOUNTER FOR PREVENTIVE HEALTH EXAMINATION: Status: ACTIVE | Noted: 2024-02-02

## 2024-02-05 PROBLEM — I10 ESSENTIAL (PRIMARY) HYPERTENSION: Chronic | Status: ACTIVE | Noted: 2024-01-25

## 2024-02-15 DIAGNOSIS — F10.11 ALCOHOL ABUSE, IN REMISSION: ICD-10-CM

## 2024-02-15 DIAGNOSIS — I63.9 CEREBRAL INFARCTION, UNSPECIFIED: ICD-10-CM

## 2024-02-15 DIAGNOSIS — E11.9 TYPE 2 DIABETES MELLITUS W/OUT COMPLICATIONS: ICD-10-CM

## 2024-02-15 DIAGNOSIS — M10.9 GOUT, UNSPECIFIED: ICD-10-CM

## 2024-02-15 DIAGNOSIS — I10 ESSENTIAL (PRIMARY) HYPERTENSION: ICD-10-CM

## 2024-02-15 RX ORDER — METFORMIN HYDROCHLORIDE 1000 MG/1
1000 TABLET, COATED ORAL TWICE DAILY
Refills: 0 | Status: ACTIVE | COMMUNITY

## 2024-02-15 RX ORDER — PANTOPRAZOLE 40 MG/1
40 TABLET, DELAYED RELEASE ORAL
Refills: 0 | Status: ACTIVE | COMMUNITY

## 2024-02-15 RX ORDER — CLOPIDOGREL BISULFATE 75 MG/1
75 TABLET, FILM COATED ORAL
Refills: 0 | Status: ACTIVE | COMMUNITY

## 2024-02-15 RX ORDER — ALLOPURINOL 100 MG/1
100 TABLET ORAL
Refills: 0 | Status: ACTIVE | COMMUNITY

## 2024-02-15 RX ORDER — ATORVASTATIN CALCIUM 40 MG/1
40 TABLET, FILM COATED ORAL
Refills: 0 | Status: ACTIVE | COMMUNITY

## 2024-02-15 RX ORDER — METOPROLOL TARTRATE 50 MG/1
50 TABLET, FILM COATED ORAL TWICE DAILY
Refills: 0 | Status: ACTIVE | COMMUNITY

## 2024-02-16 ENCOUNTER — APPOINTMENT (OUTPATIENT)
Dept: ELECTROPHYSIOLOGY | Facility: CLINIC | Age: 74
End: 2024-02-16

## 2024-03-05 ENCOUNTER — NON-APPOINTMENT (OUTPATIENT)
Age: 74
End: 2024-03-05

## 2024-03-05 ENCOUNTER — APPOINTMENT (OUTPATIENT)
Dept: ELECTROPHYSIOLOGY | Facility: CLINIC | Age: 74
End: 2024-03-05
Payer: MEDICARE

## 2024-03-05 PROCEDURE — 93298 REM INTERROG DEV EVAL SCRMS: CPT

## 2024-04-08 ENCOUNTER — NON-APPOINTMENT (OUTPATIENT)
Age: 74
End: 2024-04-08

## 2024-04-08 ENCOUNTER — APPOINTMENT (OUTPATIENT)
Dept: ELECTROPHYSIOLOGY | Facility: CLINIC | Age: 74
End: 2024-04-08
Payer: MEDICARE

## 2024-04-08 PROCEDURE — 93298 REM INTERROG DEV EVAL SCRMS: CPT

## 2024-04-16 ENCOUNTER — NON-APPOINTMENT (OUTPATIENT)
Age: 74
End: 2024-04-16

## 2024-04-16 ENCOUNTER — APPOINTMENT (OUTPATIENT)
Dept: ELECTROPHYSIOLOGY | Facility: CLINIC | Age: 74
End: 2024-04-16
Payer: MEDICARE

## 2024-04-16 VITALS
SYSTOLIC BLOOD PRESSURE: 109 MMHG | BODY MASS INDEX: 27.64 KG/M2 | WEIGHT: 156 LBS | HEART RATE: 69 BPM | OXYGEN SATURATION: 99 % | DIASTOLIC BLOOD PRESSURE: 73 MMHG | HEIGHT: 63 IN

## 2024-04-16 PROCEDURE — 93000 ELECTROCARDIOGRAM COMPLETE: CPT

## 2024-04-16 PROCEDURE — 99213 OFFICE O/P EST LOW 20 MIN: CPT | Mod: 25

## 2024-04-16 NOTE — DISCUSSION/SUMMARY
[FreeTextEntry1] : 1] History of CVA and implantation of ILR: ECG performed today to check for AF showed sinus rhythm. Patient educated by Jaylin and me about the purpose of the ILR and how it functions. Patient did bring in her Relay monitor which was discussed. Patient to continue remote monitoring as per protocol.  [EKG obtained to assist in diagnosis and management of assessed problem(s)] : EKG obtained to assist in diagnosis and management of assessed problem(s)

## 2024-04-16 NOTE — HISTORY OF PRESENT ILLNESS
[FreeTextEntry1] : Ms. TATIANNA SALAZAR is a 73-year-old woman with past medical history of HTN, HLD, T2DM, Gout, ETOH abuse, GERD, CVA s/p LOOP implant who is here because to follow-up on ILR. She was admitted to the hospital in late January 2024 because of confusion.  CT scan of head demonstrated possible straight sinus thrombosis.  An MRI of the brain and MR venogram was performed which showed acute infarcts in multiple territories.  The EP service was called for implantation of ILR, which was implanted on 2/1/2024. She is here today to get more education about the ILR.

## 2024-05-14 ENCOUNTER — APPOINTMENT (OUTPATIENT)
Dept: ELECTROPHYSIOLOGY | Facility: CLINIC | Age: 74
End: 2024-05-14

## 2024-06-03 ENCOUNTER — NON-APPOINTMENT (OUTPATIENT)
Age: 74
End: 2024-06-03

## 2024-06-03 ENCOUNTER — APPOINTMENT (OUTPATIENT)
Dept: ELECTROPHYSIOLOGY | Facility: CLINIC | Age: 74
End: 2024-06-03
Payer: MEDICARE

## 2024-06-03 PROCEDURE — 93298 REM INTERROG DEV EVAL SCRMS: CPT

## 2024-06-12 ENCOUNTER — APPOINTMENT (OUTPATIENT)
Dept: NEUROLOGY | Facility: CLINIC | Age: 74
End: 2024-06-12

## 2024-07-08 ENCOUNTER — NON-APPOINTMENT (OUTPATIENT)
Age: 74
End: 2024-07-08

## 2024-07-08 ENCOUNTER — APPOINTMENT (OUTPATIENT)
Dept: ELECTROPHYSIOLOGY | Facility: CLINIC | Age: 74
End: 2024-07-08
Payer: MEDICARE

## 2024-07-08 PROCEDURE — 93298 REM INTERROG DEV EVAL SCRMS: CPT

## 2024-08-08 ENCOUNTER — APPOINTMENT (OUTPATIENT)
Dept: ELECTROPHYSIOLOGY | Facility: CLINIC | Age: 74
End: 2024-08-08

## 2024-08-08 ENCOUNTER — NON-APPOINTMENT (OUTPATIENT)
Age: 74
End: 2024-08-08

## 2024-08-08 PROCEDURE — 93298 REM INTERROG DEV EVAL SCRMS: CPT

## 2024-09-12 ENCOUNTER — NON-APPOINTMENT (OUTPATIENT)
Age: 74
End: 2024-09-12

## 2024-09-12 ENCOUNTER — APPOINTMENT (OUTPATIENT)
Dept: ELECTROPHYSIOLOGY | Facility: CLINIC | Age: 74
End: 2024-09-12
Payer: MEDICARE

## 2024-09-12 PROCEDURE — 93298 REM INTERROG DEV EVAL SCRMS: CPT

## 2024-10-05 NOTE — PROGRESS NOTE ADULT - PROBLEM/PLAN-6
DISPLAY PLAN FREE TEXT
Principal Discharge DX:	Visit for wound check  
DISPLAY PLAN FREE TEXT
1
DISPLAY PLAN FREE TEXT

## 2024-10-16 ENCOUNTER — APPOINTMENT (OUTPATIENT)
Dept: ELECTROPHYSIOLOGY | Facility: CLINIC | Age: 74
End: 2024-10-16
Payer: MEDICARE

## 2024-10-16 ENCOUNTER — NON-APPOINTMENT (OUTPATIENT)
Age: 74
End: 2024-10-16

## 2024-10-16 PROCEDURE — 93298 REM INTERROG DEV EVAL SCRMS: CPT

## 2024-10-27 ENCOUNTER — EMERGENCY (EMERGENCY)
Facility: HOSPITAL | Age: 74
LOS: 1 days | Discharge: HOME | End: 2024-10-27
Admitting: EMERGENCY MEDICINE
Payer: MEDICARE

## 2024-10-27 VITALS
OXYGEN SATURATION: 100 % | TEMPERATURE: 98 F | DIASTOLIC BLOOD PRESSURE: 85 MMHG | SYSTOLIC BLOOD PRESSURE: 144 MMHG | RESPIRATION RATE: 16 BRPM | HEART RATE: 65 BPM

## 2024-10-27 VITALS
HEART RATE: 67 BPM | WEIGHT: 160.06 LBS | TEMPERATURE: 98 F | RESPIRATION RATE: 16 BRPM | SYSTOLIC BLOOD PRESSURE: 147 MMHG | OXYGEN SATURATION: 100 % | DIASTOLIC BLOOD PRESSURE: 81 MMHG

## 2024-10-27 PROCEDURE — 93971 EXTREMITY STUDY: CPT | Mod: 26,LT

## 2024-10-27 PROCEDURE — 99284 EMERGENCY DEPT VISIT MOD MDM: CPT

## 2024-10-27 PROCEDURE — 73564 X-RAY EXAM KNEE 4 OR MORE: CPT | Mod: 26,LT

## 2024-10-27 RX ORDER — ACETAMINOPHEN 325 MG
650 TABLET ORAL ONCE
Refills: 0 | Status: COMPLETED | OUTPATIENT
Start: 2024-10-27 | End: 2024-10-27

## 2024-10-27 RX ADMIN — Medication 650 MILLIGRAM(S): at 13:50

## 2024-10-27 NOTE — ED ADULT NURSE NOTE - BIRTH SEX
Detail Level: Zone
Products Recommended: And hats and covering skin
General Sunscreen Counseling: I recommended a broad spectrum sunscreen with a SPF of 30 or higher.  I explained that SPF 30 sunscreens block approximately 97 percent of the sun's harmful rays.  Sunscreens should be applied at least 15 minutes prior to expected sun exposure and then every 2 hours after that as long as sun exposure continues. If swimming or exercising sunscreen should be reapplied every 45 minutes to an hour after getting wet or sweating.  One ounce, or the equivalent of a shot glass full of sunscreen, is adequate to protect the skin not covered by a bathing suit. I also recommended a lip balm with a sunscreen as well. Sun protective clothing can be used in lieu of sunscreen but must be worn the entire time you are exposed to the sun's rays.
Female

## 2024-10-27 NOTE — ED ADULT NURSE NOTE - OBJECTIVE STATEMENT
Pt. received to int. 12 A&Ox4 ambulatory at baseline p/w L knee pain. pt. endorses x5 days of L knee pain after she felt a "pop" in the posterior aspect of the L knee a/w difficulty ambulating. Full ROM noted. NAD Noted at this time. respirations even and unlabored on RA. meds given via LPN. pending US and XR. comfort measures provided. safety precautions maintained.

## 2024-10-27 NOTE — ED ADULT NURSE NOTE - NSFALLUNIVINTERV_ED_ALL_ED
Bed/Stretcher in lowest position, wheels locked, appropriate side rails in place/Call bell, personal items and telephone in reach/Instruct patient to call for assistance before getting out of bed/chair/stretcher/Non-slip footwear applied when patient is off stretcher/[place] to call system/Physically safe environment - no spills, clutter or unnecessary equipment/Purposeful proactive rounding/Room/bathroom lighting operational, light cord in reach

## 2024-10-27 NOTE — ED PROVIDER NOTE - PATIENT PORTAL LINK FT
You can access the FollowMyHealth Patient Portal offered by [facility] by registering at the following website: [email or web address]. By joining [employer]’s FollowMyHealth portal, you will also be able to view your health information using other applications (apps) compatible with our system.

## 2024-10-27 NOTE — ED PROVIDER NOTE - NSFOLLOWUPINSTRUCTIONS_ED_ALL_ED_FT
Follow-up with your primary care doctor within 1 week  Follow-up with an orthopedic doctor within 1 week, discuss having MRI performed  Rest, ice, elevate the left knee  Take Tylenol 650 mg every 6 hours as needed for pain  Wear knee immobilizer with Ace bandage throughout the day for stability and support of the knee  Use cane as needed to help with assistance with walking  Return to the ER with any worsening or concerning symptoms increased pain, swelling, fever/chills, skin changes to the area or any other concerns.

## 2024-10-27 NOTE — ED PROVIDER NOTE - OBJECTIVE STATEMENT
74-year-old female with past medical history DM2, HTN, gout, GERD, remote history of EtOH abuse presenting to the ER with left knee pain.  Patient states approximately 5 days ago she was walking upstairs when she felt a pop in the back of her left knee and reports pain and difficulty with walking since then.  Patient states that she was 15 years old she has small amount of swelling on the left leg at her baseline.  Patient denies fall, head strike, numbness/tingling, weakness, fever/chills, joint swelling, abdominal pain, N/V/D, history of DVT, blood thinner use, recent travel or illness or any other concerns.

## 2024-10-27 NOTE — ED PROVIDER NOTE - PHYSICAL EXAMINATION
left knee with no obvious swelling or deformity, no anterior tenderness, no joint line tenderness, +mild ttp posterior fossa left knee, no overlying warmth, FROM of left knee  minimal swelling to LLE which pt states is at her baseline

## 2024-10-27 NOTE — ED ADULT TRIAGE NOTE - CHIEF COMPLAINT QUOTE
Pt reports she was walking upstairs when she heard her left knee "pop", now endorsing pain  in area, states incident occurred on monday. Left leg swollen however she reports that is her baseline (xfew years.) pmhx: dm2, htn

## 2024-10-27 NOTE — ED PROVIDER NOTE - CLINICAL SUMMARY MEDICAL DECISION MAKING FREE TEXT BOX
74-year-old female with past medical history DM2, HTN, gout, GERD, remote history of EtOH abuse presenting to the ER with left knee pain.  Patient states approximately 5 days ago she was walking upstairs when she felt a pop in the back of her left knee and reports pain and difficulty with walking since then.  Patient states that she was 15 years old she has small amount of swelling on the left leg at her baseline. No fall, head strike, numbness, weakness, fever/chills. On exam pt is well appearing, afebrile, left knee with no obvious swelling or deformity, no anterior tenderness, no joint line tenderness, +mild ttp posterior fossa left knee, no overlying warmth, FROM of left knee, minimal swelling to LLE which pt states is at her baseline. Concern for Bakers Cyst, soft tissue injury of knee, less likely bony pathology/dvt. Exam and history not consistent with septic joint, no fever/swelling, redness, pt has FROM of joint on exam. Plan: xray left knee, US duplex LLE to r/o DVT/bakers cyst, tylenol.

## 2024-11-20 ENCOUNTER — NON-APPOINTMENT (OUTPATIENT)
Age: 74
End: 2024-11-20

## 2024-11-20 ENCOUNTER — APPOINTMENT (OUTPATIENT)
Dept: ELECTROPHYSIOLOGY | Facility: CLINIC | Age: 74
End: 2024-11-20
Payer: MEDICARE

## 2024-11-20 PROCEDURE — 93298 REM INTERROG DEV EVAL SCRMS: CPT

## 2024-12-12 NOTE — PATIENT PROFILE ADULT - FUNCTIONAL SCREEN CURRENT LEVEL: COMMUNICATION, MLM
Message from pharmacy    Caller: Unspecified (Today,  8:30 AM)  Charisse with [facility] is calling for clarification on quetiapine. They have 25 and 100 mg both in the evening and need to make sure they are taking both.  [phone]    Is one daily and one nightly?   0 = understands/communicates without difficulty

## 2024-12-26 ENCOUNTER — APPOINTMENT (OUTPATIENT)
Dept: ELECTROPHYSIOLOGY | Facility: CLINIC | Age: 74
End: 2024-12-26
Payer: MEDICARE

## 2024-12-26 ENCOUNTER — NON-APPOINTMENT (OUTPATIENT)
Age: 74
End: 2024-12-26

## 2024-12-26 PROCEDURE — 93298 REM INTERROG DEV EVAL SCRMS: CPT

## 2025-01-30 ENCOUNTER — NON-APPOINTMENT (OUTPATIENT)
Age: 75
End: 2025-01-30

## 2025-01-30 ENCOUNTER — APPOINTMENT (OUTPATIENT)
Dept: ELECTROPHYSIOLOGY | Facility: CLINIC | Age: 75
End: 2025-01-30
Payer: MEDICARE

## 2025-01-30 PROCEDURE — 93298 REM INTERROG DEV EVAL SCRMS: CPT

## 2025-03-04 DIAGNOSIS — M25.562 PAIN IN LEFT KNEE: ICD-10-CM

## 2025-03-05 ENCOUNTER — APPOINTMENT (OUTPATIENT)
Dept: ORTHOPEDIC SURGERY | Facility: CLINIC | Age: 75
End: 2025-03-05
Payer: MEDICARE

## 2025-03-05 VITALS — WEIGHT: 159 LBS | HEIGHT: 63 IN | BODY MASS INDEX: 28.17 KG/M2

## 2025-03-05 DIAGNOSIS — S83.242A OTHER TEAR OF MEDIAL MENISCUS, CURRENT INJURY, LEFT KNEE, INITIAL ENCOUNTER: ICD-10-CM

## 2025-03-05 DIAGNOSIS — M17.12 UNILATERAL PRIMARY OSTEOARTHRITIS, LEFT KNEE: ICD-10-CM

## 2025-03-05 PROCEDURE — 99203 OFFICE O/P NEW LOW 30 MIN: CPT

## 2025-03-05 PROCEDURE — 73564 X-RAY EXAM KNEE 4 OR MORE: CPT | Mod: LT

## 2025-03-05 PROCEDURE — G2211 COMPLEX E/M VISIT ADD ON: CPT

## 2025-03-06 ENCOUNTER — APPOINTMENT (OUTPATIENT)
Dept: ELECTROPHYSIOLOGY | Facility: CLINIC | Age: 75
End: 2025-03-06
Payer: MEDICARE

## 2025-03-06 ENCOUNTER — NON-APPOINTMENT (OUTPATIENT)
Age: 75
End: 2025-03-06

## 2025-03-06 PROCEDURE — 93298 REM INTERROG DEV EVAL SCRMS: CPT

## 2025-04-10 ENCOUNTER — NON-APPOINTMENT (OUTPATIENT)
Age: 75
End: 2025-04-10

## 2025-04-10 ENCOUNTER — APPOINTMENT (OUTPATIENT)
Dept: ELECTROPHYSIOLOGY | Facility: CLINIC | Age: 75
End: 2025-04-10
Payer: MEDICARE

## 2025-04-10 PROCEDURE — 93298 REM INTERROG DEV EVAL SCRMS: CPT

## 2025-04-30 ENCOUNTER — APPOINTMENT (OUTPATIENT)
Dept: ORTHOPEDIC SURGERY | Facility: CLINIC | Age: 75
End: 2025-04-30
Payer: MEDICARE

## 2025-04-30 VITALS — BODY MASS INDEX: 28.17 KG/M2 | WEIGHT: 159 LBS | HEIGHT: 63 IN

## 2025-04-30 DIAGNOSIS — M17.12 UNILATERAL PRIMARY OSTEOARTHRITIS, LEFT KNEE: ICD-10-CM

## 2025-04-30 DIAGNOSIS — S83.242A OTHER TEAR OF MEDIAL MENISCUS, CURRENT INJURY, LEFT KNEE, INITIAL ENCOUNTER: ICD-10-CM

## 2025-04-30 PROCEDURE — G2211 COMPLEX E/M VISIT ADD ON: CPT

## 2025-04-30 PROCEDURE — 99213 OFFICE O/P EST LOW 20 MIN: CPT

## 2025-05-13 ENCOUNTER — NON-APPOINTMENT (OUTPATIENT)
Age: 75
End: 2025-05-13

## 2025-05-13 ENCOUNTER — APPOINTMENT (OUTPATIENT)
Dept: ELECTROPHYSIOLOGY | Facility: CLINIC | Age: 75
End: 2025-05-13
Payer: MEDICARE

## 2025-05-13 PROCEDURE — 93298 REM INTERROG DEV EVAL SCRMS: CPT

## 2025-06-17 ENCOUNTER — APPOINTMENT (OUTPATIENT)
Dept: ELECTROPHYSIOLOGY | Facility: CLINIC | Age: 75
End: 2025-06-17
Payer: MEDICARE

## 2025-06-17 ENCOUNTER — NON-APPOINTMENT (OUTPATIENT)
Age: 75
End: 2025-06-17

## 2025-06-17 PROCEDURE — 93298 REM INTERROG DEV EVAL SCRMS: CPT

## 2025-07-06 NOTE — ED ADULT NURSE NOTE - NS ED NURSE RECORD ANOTHER VITAL SIGN
Vascular Surgery Progress Note    SUBJECTIVE  Patient looking much better than when I last saw him a few days ago.  Sitting up in his chair.  Pain controlled.    OBJECTIVE  Vitals:  /85 (BP Location: RUE - Right upper extremity, Patient Position: Supine)   Pulse 80   Temp 98.4 °F (36.9 °C) (Oral)   Resp 16   Ht 6' (1.829 m)   Wt (!) 146.2 kg (322 lb 5 oz)   BMI 43.71 kg/m²   BSA 2.61 m²      Physical Exam:  General: NAD  HEENT: EOMI  CV: Regular rate  Resp: Unlabored  Abdomen: Soft, nondistended  MSK: WATTERS  Neuro: Grossly intact  Vascular: Bounding left DP pulse.  Multiphasic left PT signal, difficult to palpate due to hardware and dressings in place.  Foot is pink and warm.  Motor and sensation intact surprisingly.  No nonhealing wounds.  Swelling has come down significantly.    Significant Labs:  Most recent CBC:   WBC (K/mcL)   Date Value   2025 14.1 (H)     RBC (mil/mcL)   Date Value   2025 3.24 (L)     HCT (%)   Date Value   2025 30.4 (L)     HGB (g/dL)   Date Value   2025 10.2 (L)     PLT (K/mcL)   Date Value   2025 266       Most recent INR: No results found for: \"INR\"  Most recent creatinine:   Creatinine (mg/dL)   Date Value   2025 1.17     Most recent GFR:   Glomerular Filtration Rate (no units)   Date Value   2025 77       Cholesterol, target LDL< 100:   CALCULATED LDL (mg/dL)   Date Value   2012 96     Diabetes, target A1C <7: No results found for: \"HGBA1C\"   Albumin, target >3.5:   Albumin (g/dL)   Date Value   2025 3.6     Pre-albumin, target >20: No results found for: \"PREALBUMIN\"    Imagin/3/25 CTA aorta   CTA RUNOFF (A/P + Wm Lower Ext)     IMPRESSION: The aorta and mesenteric vessels are all widely patent.  No   aneurysm or dissection.  The iliac arteries are widely patent bilaterally.     Comminuted open fractures of the distal left tibia and fibula.  The left   common femoral, superficial femoral, and popliteal arteries are  widely   patent.  The left calf arteries are small in caliber suggesting vasospasm.    The bolus timing is suboptimal.  No definite arterial injury or   occlusion is identified.  No active extravasation is seen.       Normal appearance of the arterial structures in the right lower extremity.    There is three-vessel runoff in the right calf with a small caliber   anterior tibial artery and dominant posterior tibial artery.     7 mm fracture fragment from the anterior lip of the distal left tibia at   the ankle.  There is gas within the ankle joint suggesting open fracture.     INCIDENTAL FINDINGS: Calcified granuloma in the right lower lung.       Mild degenerative changes throughout the spine.  No acute fracture or   subluxation is seen.     No acute traumatic or inflammatory process is seen within the abdomen or   pelvis.    ASSESSMENT  49M who presented with a left distal tib/fib fracture and degloving injury, s/p exfix and wound closure by Ortho (7/3) who initially had concerns for possible left lower extremity arterial injury.  Upon personal review of his CTA, he appears to have three-vessel runoff, though difficult to tell due to the significant swelling and bony fractures. He had resassuring excellent signals in the operating room after the ex-fix was performed (7/3). He had another I&D and ex-fix adjustment (7/5). He now has a palpable left DP pulse and multiphasic PT signal (difficult to palpate due the hardware and dressings in place). Recommend 3 months of ASA 81mg due to stretch injury of the vessels resulting in temporary spasm when he initially presented with duskiness.     PLAN  -No plans for vascular intervention at this time  -Serial neurovascular exams  -Recommend 3 months of ASA 81mg due to stretch injury of the vessels resulting in temporary spasm when he initially presented with duskiness. Ok to stop and resume per Ortho for any of their surgeries.   -Vascular Surgery will no longer follow, but  will remain available as needed  -No need for follow up with Vascular Surgery      Sadie Wolf MD  Vascular & Endovascular Surgery  [facility]  Phone: [phone]  Pager: [phone]       Yes

## 2025-07-18 ENCOUNTER — NON-APPOINTMENT (OUTPATIENT)
Age: 75
End: 2025-07-18

## 2025-07-18 ENCOUNTER — APPOINTMENT (OUTPATIENT)
Dept: ELECTROPHYSIOLOGY | Facility: CLINIC | Age: 75
End: 2025-07-18
Payer: MEDICARE

## 2025-07-18 PROCEDURE — 93298 REM INTERROG DEV EVAL SCRMS: CPT

## 2025-08-22 ENCOUNTER — APPOINTMENT (OUTPATIENT)
Dept: ELECTROPHYSIOLOGY | Facility: CLINIC | Age: 75
End: 2025-08-22
Payer: MEDICARE

## 2025-08-22 ENCOUNTER — NON-APPOINTMENT (OUTPATIENT)
Age: 75
End: 2025-08-22

## 2025-08-22 PROCEDURE — 93298 REM INTERROG DEV EVAL SCRMS: CPT
